# Patient Record
Sex: FEMALE | Race: WHITE | NOT HISPANIC OR LATINO | Employment: OTHER | URBAN - METROPOLITAN AREA
[De-identification: names, ages, dates, MRNs, and addresses within clinical notes are randomized per-mention and may not be internally consistent; named-entity substitution may affect disease eponyms.]

---

## 2018-12-28 ENCOUNTER — APPOINTMENT (OUTPATIENT)
Dept: WOMENS IMAGING | Facility: HOSPITAL | Age: 68
End: 2018-12-28

## 2018-12-28 PROCEDURE — 77067 SCR MAMMO BI INCL CAD: CPT | Performed by: RADIOLOGY

## 2018-12-28 PROCEDURE — 77063 BREAST TOMOSYNTHESIS BI: CPT | Performed by: RADIOLOGY

## 2018-12-28 PROCEDURE — 77080 DXA BONE DENSITY AXIAL: CPT | Performed by: RADIOLOGY

## 2019-03-15 ENCOUNTER — APPOINTMENT (OUTPATIENT)
Dept: GENERAL RADIOLOGY | Facility: HOSPITAL | Age: 69
End: 2019-03-15

## 2019-03-15 PROCEDURE — 73564 X-RAY EXAM KNEE 4 OR MORE: CPT | Performed by: GENERAL PRACTICE

## 2019-03-22 ENCOUNTER — OFFICE VISIT (OUTPATIENT)
Dept: ORTHOPEDIC SURGERY | Facility: CLINIC | Age: 69
End: 2019-03-22

## 2019-03-22 VITALS — HEIGHT: 67 IN | WEIGHT: 170 LBS | TEMPERATURE: 97.6 F | BODY MASS INDEX: 26.68 KG/M2

## 2019-03-22 DIAGNOSIS — M17.10 ARTHRITIS OF KNEE: Primary | ICD-10-CM

## 2019-03-22 PROCEDURE — 99202 OFFICE O/P NEW SF 15 MIN: CPT | Performed by: ORTHOPAEDIC SURGERY

## 2019-03-22 RX ORDER — PHENOL 1.4 %
600 AEROSOL, SPRAY (ML) MUCOUS MEMBRANE DAILY
COMMUNITY

## 2019-03-22 RX ORDER — MELATONIN
1000 DAILY
COMMUNITY

## 2019-03-24 NOTE — PROGRESS NOTES
Patient: Eunice Beach    YOB: 1950    Medical Record Number: [id number]    Chief Complaints:  Right knee pain    History of Present Illness:     68 y.o. female patient who presents for evaluation of her right knee.  She injured the knee while stepping off of the tour bus in [place] approximately 3 weeks ago.  She felt a sharp, shooting pain in her knee at the time.  The pain has gotten significantly better over the past few weeks.  Her symptoms are worse with standing for prolonged period of time.  Tylenol, ice and a brace have all helped.  Denies any clicking, popping or catching.  Denies any other associated complaints or issues.    Allergies: No Known Allergies    Medications:   Home Medications    Current Outpatient Medications:   •  acetaminophen (TYLENOL) 500 MG tablet, Take 1,000 mg by mouth., Disp: , Rfl:   •  alendronate (FOSAMAX) 70 MG tablet, Take 70 mg by mouth Every 7 (Seven) Days., Disp: , Rfl:   •  amLODIPine (NORVASC) 10 MG tablet, Take 10 mg by mouth Daily., Disp: , Rfl:   •  calcium carbonate (OS-ULYSSES) 600 MG tablet, Take 600 mg by mouth 2 (Two) Times a Day With Meals., Disp: , Rfl:   •  cholecalciferol (VITAMIN D3) 1000 units tablet, Take 1,000 Units by mouth Daily., Disp: , Rfl:   •  lisinopril (PRINIVIL,ZESTRIL) 40 MG tablet, Take 40 mg by mouth Daily., Disp: , Rfl:   •  metoprolol tartrate (LOPRESSOR) 50 MG tablet, Take 50 mg by mouth 2 (Two) Times a Day., Disp: , Rfl:   •  mirtazapine (REMERON) 15 MG tablet, Take 15 mg by mouth Every Night., Disp: , Rfl:   •  rOPINIRole (REQUIP) 1 MG tablet, Take 1 mg by mouth Every Night., Disp: , Rfl:   •  tiZANidine (ZANAFLEX) 2 MG tablet, Take 2 mg by mouth Every 8 (Eight) Hours As Needed., Disp: , Rfl:     Past Medical History:   Diagnosis Date   • Hypertension    • Osteopenia    • Postmenopausal    • RLS (restless legs syndrome)    • Vitamin D deficiency        History reviewed. No pertinent surgical history.    Social History  "    Occupational History   • Not on file   Tobacco Use   • Smoking status: Unknown If Ever Smoked   • Smokeless tobacco: Never Used   Substance and Sexual Activity   • Alcohol use: No     Frequency: Never   • Drug use: No   • Sexual activity: Not on file      Social History     Social History Narrative   • Not on file       History reviewed. No pertinent family history.    Review of Systems:      Constitutional: Denies fever, shaking or chills   Eyes: Denies change in visual acuity   HEENT: Denies nasal congestion or sore throat   Respiratory: Denies cough or shortness of breath   Cardiovascular: Denies chest pain or edema  Endocrine: Denies tremors, palpitations, intolerance of heat or cold, polyuria, polydipsia.  GI: Denies abdominal pain, nausea, vomiting, bloody stools or diarrhea  : Denies frequency, urgency, incontinence, retention, or nocturia.  Musculoskeletal: Denies numbness, tingling or loss of motor function except as above  Integument: Denies rash, lesion or ulceration   Neurologic: Denies headache or focal weakness, deficits  Heme: Denies spontaneous or excessive bleeding, epistaxis, hematuria, melena, fatigue, enlarged or tender lymph nodes.      All other pertinent positives and negatives as noted above in HPI.    Physical Exam: 68 y.o. female  Vitals:    03/22/19 1124   Temp: 97.6 °F (36.4 °C)   TempSrc: Temporal   Weight: 77.1 kg (170 lb)   Height: 170.2 cm (67\")     General:  Patient is awake and alert.  Appears in no acute distress or discomfort.    Psych:  Affect and demeanor are appropriate.    Eyes:  Conjunctiva and sclera appear grossly normal.  Eyes track well and EOM seem to be intact.    Ears:  No gross abnormalities.  Hearing adequate for the exam.    Cardiovascular:  Regular rate and rhythm.    Lungs:  Good chest expansion.  Breathing unlabored.    Spine:  Back appears grossly normal.  No palpable masses or adenopathy.  Good motion.  Straight leg raise and crossed straight leg raise " manuever are both negative for lower leg and/or knee pain.    Extremities:  Skin is benign.  No obvious gross abnormalities about right knee.  No palpable masses or adenopathy.  Mild tenderness noted over lateral joint line.  Motion is symmetric to the contralateral side.  No instability.  Strength is well preserved including hip flexion, knee extension, ankle and toe plantarflexion, ankle inversion and eversion.  Good sensory function throughout the leg and foot.  Palpable pulses.  Brisk capillary refill.  Good skin turgor.         Radiology:   Outside AP, oblique and lateral views of the right knee are reviewed on the epic system along with the associated report.  She has tricompartment degenerative changes.  She appears to have significant lateral compartment osteoarthritis with near bone-on-bone degeneration, osteophyte formation and significant subchondral sclerosis.  She may have a component of avascular necrosis as well.    Assessment/Plan:  Right knee osteoarthritis    I showed her the x-rays and explained the significance of the findings.  She is minimally symptomatic at this point and I do not recommend any intervention for her.  Down the road, if her symptoms worsen, she may be a candidate for injections.  She will follow-up as needed.    Garo Sutton MD    03/22/2019

## 2019-04-10 ENCOUNTER — OFFICE VISIT (OUTPATIENT)
Dept: ORTHOPEDIC SURGERY | Facility: CLINIC | Age: 69
End: 2019-04-10

## 2019-04-10 VITALS — BODY MASS INDEX: 26.68 KG/M2 | TEMPERATURE: 97.2 F | WEIGHT: 170 LBS | HEIGHT: 67 IN

## 2019-04-10 DIAGNOSIS — M17.11 PRIMARY OSTEOARTHRITIS OF RIGHT KNEE: Primary | ICD-10-CM

## 2019-04-10 PROCEDURE — 99212 OFFICE O/P EST SF 10 MIN: CPT | Performed by: NURSE PRACTITIONER

## 2019-04-10 PROCEDURE — 20610 DRAIN/INJ JOINT/BURSA W/O US: CPT | Performed by: NURSE PRACTITIONER

## 2019-04-10 RX ORDER — METHYLPREDNISOLONE ACETATE 80 MG/ML
80 INJECTION, SUSPENSION INTRA-ARTICULAR; INTRALESIONAL; INTRAMUSCULAR; SOFT TISSUE
Status: COMPLETED | OUTPATIENT
Start: 2019-04-10 | End: 2019-04-10

## 2019-04-10 RX ADMIN — METHYLPREDNISOLONE ACETATE 80 MG: 80 INJECTION, SUSPENSION INTRA-ARTICULAR; INTRALESIONAL; INTRAMUSCULAR; SOFT TISSUE at 11:11

## 2019-04-10 NOTE — PROGRESS NOTES
"Eunice Beach     : 1950     MRN: [id number]     DATE: 4/10/2019    CC:   Right knee pain    SUBJECTIVE:  Patient returns today for follow up.  Reports her pain has slightly improved since her last visit with Dr. Sutton on 19, but continues to complain of pain with standing and walking.  She has tried Tylenol with mild, temporary relief.  She is interested in a steroid injection, as discussed with Dr. Sutton.    OBJECTIVE:    Temp 97.2 °F (36.2 °C) (Temporal)   Ht 170.2 cm (67\")   Wt 77.1 kg (170 lb)   BMI 26.63 kg/m²     Exam:.     General:  Patient is awake and alert.  No acute distress.  Affect and demeanor appropriate.    Extremities:  Skin about knee is benign.  Motion remains full, but uncomfortable.  Gait is slightly antalgic.    DIAGNOSTIC STUDIES    Xrays:  Previous x-rays are reviewed.  The x-rays show she has tricompartment degenerative changes.  She appears to have significant lateral compartment osteoarthritis with near bone-on-bone degeneration, osteophyte formation and significant subchondral sclerosis.      ASSESSMENT:  Right knee osteoarthritis    PLAN:  We discussed treatment options in detail.  I recommended she try a corticosteroid injection today.  The risks, benefits, and alternatives were thoroughly discussed.  Patient consented to proceed.  Also, I have entered an order for physical therapy.  Going forward, I will release the patient to follow-up as needed.  I informed the patient that if the pain persist and/or recurs,  I would be happy to see her back.      Large Joint Arthrocentesis: R knee  Date/Time: 4/10/2019 11:11 AM  Consent given by: patient  Site marked: site marked  Timeout: Immediately prior to procedure a time out was called to verify the correct patient, procedure, equipment, support staff and site/side marked as required   Supporting Documentation  Indications: pain   Procedure Details  Location: knee - R knee  Preparation: Patient was prepped and draped " in the usual sterile fashion  Needle gauge: 21 G.  Approach: anterolateral  Medications administered: 80 mg methylPREDNISolone acetate 80 MG/ML; 2 mL lidocaine (cardiac) 20 MG/ML  Patient tolerance: patient tolerated the procedure well with no immediate complications        ROCK Knight  04/10/2019

## 2019-04-30 ENCOUNTER — HOSPITAL ENCOUNTER (OUTPATIENT)
Dept: PHYSICAL THERAPY | Facility: HOSPITAL | Age: 69
Setting detail: THERAPIES SERIES
Discharge: HOME | End: 2019-04-30

## 2019-04-30 DIAGNOSIS — G89.29 CHRONIC PAIN OF RIGHT KNEE: Primary | ICD-10-CM

## 2019-04-30 DIAGNOSIS — M25.561 CHRONIC PAIN OF RIGHT KNEE: Primary | ICD-10-CM

## 2019-04-30 PROCEDURE — 97110 THERAPEUTIC EXERCISES: CPT | Performed by: PHYSICAL THERAPIST

## 2019-04-30 PROCEDURE — 97161 PT EVAL LOW COMPLEX 20 MIN: CPT | Performed by: PHYSICAL THERAPIST

## 2019-07-22 ENCOUNTER — CLINICAL SUPPORT (OUTPATIENT)
Dept: ORTHOPEDIC SURGERY | Facility: CLINIC | Age: 69
End: 2019-07-22

## 2019-07-22 VITALS — BODY MASS INDEX: 26.68 KG/M2 | TEMPERATURE: 98.1 F | HEIGHT: 67 IN | WEIGHT: 170 LBS

## 2019-07-22 DIAGNOSIS — M17.11 PRIMARY OSTEOARTHRITIS OF RIGHT KNEE: Primary | ICD-10-CM

## 2019-07-22 PROCEDURE — 20610 DRAIN/INJ JOINT/BURSA W/O US: CPT | Performed by: NURSE PRACTITIONER

## 2019-07-22 RX ORDER — LIDOCAINE HYDROCHLORIDE 10 MG/ML
2 INJECTION, SOLUTION EPIDURAL; INFILTRATION; INTRACAUDAL; PERINEURAL
Status: COMPLETED | OUTPATIENT
Start: 2019-07-22 | End: 2019-07-22

## 2019-07-22 RX ORDER — METHYLPREDNISOLONE ACETATE 80 MG/ML
80 INJECTION, SUSPENSION INTRA-ARTICULAR; INTRALESIONAL; INTRAMUSCULAR; SOFT TISSUE
Status: COMPLETED | OUTPATIENT
Start: 2019-07-22 | End: 2019-07-22

## 2019-07-22 RX ADMIN — LIDOCAINE HYDROCHLORIDE 2 ML: 10 INJECTION, SOLUTION EPIDURAL; INFILTRATION; INTRACAUDAL; PERINEURAL at 10:55

## 2019-07-22 RX ADMIN — METHYLPREDNISOLONE ACETATE 80 MG: 80 INJECTION, SUSPENSION INTRA-ARTICULAR; INTRALESIONAL; INTRAMUSCULAR; SOFT TISSUE at 10:55

## 2019-07-22 NOTE — PROGRESS NOTES
Large Joint Arthrocentesis: R knee  Date/Time: 7/22/2019 10:55 AM  Consent given by: patient  Site marked: site marked  Timeout: Immediately prior to procedure a time out was called to verify the correct patient, procedure, equipment, support staff and site/side marked as required   Supporting Documentation  Indications: pain and joint swelling   Procedure Details  Location: knee - R knee  Preparation: Patient was prepped and draped in the usual sterile fashion  Needle gauge: 21g.  Approach: anterolateral  Medications administered: 2 mL lidocaine PF 1% 1 %; 80 mg methylPREDNISolone acetate 80 MG/ML  Patient tolerance: patient tolerated the procedure well with no immediate complications        Ms. Beach comes in today for follow-up.  Injections have worked well in the past.  The patient would like to get a repeat injection today.  The risks, benefits and alternatives were discussed and the patient consented.  Going forward, the patient will follow-up as needed.    Elizabeth Ny, ROCK    07/22/2019

## 2019-11-22 ENCOUNTER — OFFICE VISIT (OUTPATIENT)
Dept: ORTHOPEDIC SURGERY | Facility: CLINIC | Age: 69
End: 2019-11-22

## 2019-11-22 VITALS — WEIGHT: 175 LBS | HEIGHT: 67 IN | TEMPERATURE: 98.6 F | BODY MASS INDEX: 27.47 KG/M2

## 2019-11-22 DIAGNOSIS — M17.10 ARTHRITIS OF KNEE: Primary | ICD-10-CM

## 2019-11-22 PROCEDURE — 20610 DRAIN/INJ JOINT/BURSA W/O US: CPT | Performed by: ORTHOPAEDIC SURGERY

## 2019-11-22 RX ORDER — INFLUENZA A VIRUS A/MICHIGAN/45/2015 X-275 (H1N1) ANTIGEN (FORMALDEHYDE INACTIVATED), INFLUENZA A VIRUS A/SINGAPORE/INFIMH-16-0019/2016 IVR-186 (H3N2) ANTIGEN (FORMALDEHYDE INACTIVATED), AND INFLUENZA B VIRUS B/MARYLAND/15/2016 BX-69A (A B/COLORADO/6/2017-LIKE VIRUS) ANTIGEN (FORMALDEHYDE INACTIVATED) 60; 60; 60 UG/.5ML; UG/.5ML; UG/.5ML
INJECTION, SUSPENSION INTRAMUSCULAR
Refills: 0 | COMMUNITY
Start: 2019-10-02 | End: 2020-09-04

## 2019-11-22 RX ORDER — METHYLPREDNISOLONE ACETATE 80 MG/ML
80 INJECTION, SUSPENSION INTRA-ARTICULAR; INTRALESIONAL; INTRAMUSCULAR; SOFT TISSUE
Status: COMPLETED | OUTPATIENT
Start: 2019-11-22 | End: 2019-11-22

## 2019-11-22 RX ORDER — INDAPAMIDE 2.5 MG/1
2.5 TABLET, FILM COATED ORAL DAILY
COMMUNITY
Start: 2019-11-04 | End: 2020-09-04

## 2019-11-22 RX ORDER — LIDOCAINE HYDROCHLORIDE 10 MG/ML
2 INJECTION, SOLUTION EPIDURAL; INFILTRATION; INTRACAUDAL; PERINEURAL
Status: COMPLETED | OUTPATIENT
Start: 2019-11-22 | End: 2019-11-22

## 2019-11-22 RX ADMIN — LIDOCAINE HYDROCHLORIDE 2 ML: 10 INJECTION, SOLUTION EPIDURAL; INFILTRATION; INTRACAUDAL; PERINEURAL at 09:43

## 2019-11-22 RX ADMIN — METHYLPREDNISOLONE ACETATE 80 MG: 80 INJECTION, SUSPENSION INTRA-ARTICULAR; INTRALESIONAL; INTRAMUSCULAR; SOFT TISSUE at 09:43

## 2019-11-22 NOTE — PROGRESS NOTES
Ms. Beach comes in today for a repeat knee injection.  She is gotten 2 injections over the past 6 months.  The injections have helped tremendously.  She recently traveled to [place] and was doing quite a bit of walking.  Her knee is now inflamed.  I did examine her right knee.  Skin is benign.  There is no significant effusion.  Mild to moderate lateral tenderness.    The risk, benefits and alternatives to repeat injection were discussed.  She consented and the injection was performed as described below.  She will follow-up as needed.    Large Joint Arthrocentesis: R knee  Date/Time: 11/22/2019 9:43 AM  Consent given by: patient  Site marked: site marked  Timeout: Immediately prior to procedure a time out was called to verify the correct patient, procedure, equipment, support staff and site/side marked as required   Supporting Documentation  Indications: pain and joint swelling   Procedure Details  Location: knee - R knee  Preparation: Patient was prepped and draped in the usual sterile fashion  Needle gauge: 21.  Approach: anterolateral  Medications administered: 2 mL lidocaine PF 1% 1 %; 80 mg methylPREDNISolone acetate 80 MG/ML  Patient tolerance: patient tolerated the procedure well with no immediate complications

## 2020-03-09 ENCOUNTER — CLINICAL SUPPORT (OUTPATIENT)
Dept: ORTHOPEDIC SURGERY | Facility: CLINIC | Age: 70
End: 2020-03-09

## 2020-03-09 VITALS — WEIGHT: 178 LBS | BODY MASS INDEX: 27.94 KG/M2 | HEIGHT: 67 IN | TEMPERATURE: 97.4 F

## 2020-03-09 DIAGNOSIS — M17.11 PRIMARY OSTEOARTHRITIS OF RIGHT KNEE: Primary | ICD-10-CM

## 2020-03-09 PROCEDURE — 20610 DRAIN/INJ JOINT/BURSA W/O US: CPT | Performed by: NURSE PRACTITIONER

## 2020-03-09 RX ORDER — METHYLPREDNISOLONE ACETATE 80 MG/ML
80 INJECTION, SUSPENSION INTRA-ARTICULAR; INTRALESIONAL; INTRAMUSCULAR; SOFT TISSUE
Status: COMPLETED | OUTPATIENT
Start: 2020-03-09 | End: 2020-03-09

## 2020-03-09 RX ADMIN — METHYLPREDNISOLONE ACETATE 80 MG: 80 INJECTION, SUSPENSION INTRA-ARTICULAR; INTRALESIONAL; INTRAMUSCULAR; SOFT TISSUE at 11:56

## 2020-03-09 NOTE — PROGRESS NOTES
Ms. Beach comes in today for follow-up.  Injections have worked well in the past.  The patient would like to get a repeat injection today.  She is considering surgery later this year.  The risks, benefits and alternatives were discussed and the patient consented.  Going forward, the patient will follow-up as needed.    ROCK Knight    03/09/2020      Large Joint Arthrocentesis: R knee  Date/Time: 3/9/2020 11:56 AM  Consent given by: patient  Site marked: site marked  Timeout: Immediately prior to procedure a time out was called to verify the correct patient, procedure, equipment, support staff and site/side marked as required   Supporting Documentation  Indications: pain and joint swelling   Procedure Details  Location: knee - R knee  Preparation: Patient was prepped and draped in the usual sterile fashion  Needle gauge: 21G.  Approach: anterolateral  Medications administered: 2 mL lidocaine (cardiac); 80 mg methylPREDNISolone acetate 80 MG/ML  Patient tolerance: patient tolerated the procedure well with no immediate complications

## 2020-07-06 ENCOUNTER — CLINICAL SUPPORT (OUTPATIENT)
Dept: ORTHOPEDIC SURGERY | Facility: CLINIC | Age: 70
End: 2020-07-06

## 2020-07-06 VITALS — TEMPERATURE: 98 F | HEIGHT: 67 IN | BODY MASS INDEX: 26.68 KG/M2 | WEIGHT: 170 LBS

## 2020-07-06 DIAGNOSIS — M17.11 PRIMARY OSTEOARTHRITIS OF RIGHT KNEE: Primary | ICD-10-CM

## 2020-07-06 PROCEDURE — 73562 X-RAY EXAM OF KNEE 3: CPT | Performed by: NURSE PRACTITIONER

## 2020-07-06 PROCEDURE — 20610 DRAIN/INJ JOINT/BURSA W/O US: CPT | Performed by: NURSE PRACTITIONER

## 2020-07-06 RX ORDER — METHYLPREDNISOLONE ACETATE 40 MG/ML
80 INJECTION, SUSPENSION INTRA-ARTICULAR; INTRALESIONAL; INTRAMUSCULAR; SOFT TISSUE
Status: COMPLETED | OUTPATIENT
Start: 2020-07-06 | End: 2020-07-06

## 2020-07-06 RX ADMIN — METHYLPREDNISOLONE ACETATE 80 MG: 40 INJECTION, SUSPENSION INTRA-ARTICULAR; INTRALESIONAL; INTRAMUSCULAR; SOFT TISSUE at 11:50

## 2020-07-06 NOTE — PROGRESS NOTES
Ms. Beach comes in today for follow-up.  Injections have worked well in the past.  The patient would like to get a repeat injection today.     Imaging:  Dr. Sutton and I reviewed the x-rays together.  Bilateral standing AP views, bilateral merchants views and a lateral view of the right knee are ordered by myself and reviewed to evaluate the patient's complaint. These were compared to previous films. The x-rays show tricompartmental degenerative changes.  She appears to have significant lateral compartment osteoarthritis with near bone-on-bone degeneration, osteophyte formation, and significant subchondral sclerosis.  There is a loose body noted at the lateral aspect of the patella.     The risks, benefits and alternatives were discussed and the patient consented.  Going forward, the patient will follow-up as needed.    ROCK Knight    07/06/2020      Large Joint Arthrocentesis: R knee  Date/Time: 7/6/2020 11:50 AM  Consent given by: patient  Site marked: site marked  Timeout: Immediately prior to procedure a time out was called to verify the correct patient, procedure, equipment, support staff and site/side marked as required   Supporting Documentation  Indications: pain and joint swelling   Procedure Details  Location: knee - R knee  Preparation: Patient was prepped and draped in the usual sterile fashion  Needle gauge: 21 G.  Approach: anterolateral  Medications administered: 2 mL lidocaine (cardiac); 80 mg methylPREDNISolone acetate 40 MG/ML  Patient tolerance: patient tolerated the procedure well with no immediate complications

## 2020-09-01 ENCOUNTER — APPOINTMENT (OUTPATIENT)
Dept: GENERAL RADIOLOGY | Facility: HOSPITAL | Age: 70
End: 2020-09-01

## 2020-09-01 ENCOUNTER — HOSPITAL ENCOUNTER (EMERGENCY)
Facility: HOSPITAL | Age: 70
Discharge: HOME | End: 2020-09-01
Attending: EMERGENCY MEDICINE | Admitting: EMERGENCY MEDICINE

## 2020-09-01 VITALS
HEIGHT: 67 IN | BODY MASS INDEX: 26.68 KG/M2 | DIASTOLIC BLOOD PRESSURE: 108 MMHG | RESPIRATION RATE: 20 BRPM | WEIGHT: 170 LBS | HEART RATE: 83 BPM | TEMPERATURE: 99.2 F | OXYGEN SATURATION: 94 % | SYSTOLIC BLOOD PRESSURE: 158 MMHG

## 2020-09-01 DIAGNOSIS — S82.831A CLOSED FRACTURE OF DISTAL END OF RIGHT FIBULA, UNSPECIFIED FRACTURE MORPHOLOGY, INITIAL ENCOUNTER: Primary | ICD-10-CM

## 2020-09-01 PROCEDURE — 73610 X-RAY EXAM OF ANKLE: CPT

## 2020-09-01 PROCEDURE — 99283 EMERGENCY DEPT VISIT LOW MDM: CPT

## 2020-09-01 RX ORDER — HYDROCODONE BITARTRATE AND ACETAMINOPHEN 5; 325 MG/1; MG/1
1 TABLET ORAL EVERY 6 HOURS PRN
Qty: 5 TABLET | Refills: 0 | Status: SHIPPED | OUTPATIENT
Start: 2020-09-01 | End: 2020-09-24

## 2020-09-01 NOTE — ED TRIAGE NOTES
Foot was asleep when she stood up on Sunday and she twisted it    Patient was placed in face mask during first look triage.  Patient was wearing a face mask throughout encounter.  I wore personal protective equipment throughout the encounter.  Hand hygiene was performed before and after patient encounter.

## 2020-09-01 NOTE — ED PROVIDER NOTES
EMERGENCY DEPARTMENT ENCOUNTER    Room Number:  11/11  PCP: Tianna Robins MD  Historian: Patient  History Limited By: Nothing      HPI  Chief Complaint: Right ankle injury  Context: Eunice Beach is a 69 y.o. female who presents to the ED c/o right ankle injury.  Patient states she was sitting in a recliner and stood up.  States her foot was asleep and she fell injuring her right ankle.  Patient states this happened on Sunday.  She has not had no prior ankle injury.  Patient has no weakness or numbness now.  Patient has been bearing weight since then.      Location: Right ankle  Radiation: None  Character: Aching  Duration: 2 days  Severity: Moderate  Progression: Not improving  Aggravating Factors: Walking  Alleviating Factors: Remaining still        MEDICAL RECORD REVIEW    Has been seen by Dr. Sutton in the past          PAST MEDICAL HISTORY  Active Ambulatory Problems     Diagnosis Date Noted   • No Active Ambulatory Problems     Resolved Ambulatory Problems     Diagnosis Date Noted   • No Resolved Ambulatory Problems     Past Medical History:   Diagnosis Date   • Hypertension    • Osteopenia    • Postmenopausal    • RLS (restless legs syndrome)    • Vitamin D deficiency          PAST SURGICAL HISTORY  No past surgical history on file.      FAMILY HISTORY  No family history on file.      SOCIAL HISTORY  Social History     Socioeconomic History   • Marital status: Unknown     Spouse name: Not on file   • Number of children: Not on file   • Years of education: Not on file   • Highest education level: Not on file   Tobacco Use   • Smoking status: Never Smoker   • Smokeless tobacco: Never Used   Substance and Sexual Activity   • Alcohol use: No     Frequency: Never   • Drug use: No   • Sexual activity: Defer         ALLERGIES  Zithromax [azithromycin]        REVIEW OF SYSTEMS  Review of Systems   Constitutional: Negative for fever.   Musculoskeletal: Positive for joint swelling.   Neurological: Negative  for weakness and numbness.            PHYSICAL EXAM  ED Triage Vitals   Temp Heart Rate Resp BP SpO2   09/01/20 1222 09/01/20 1222 09/01/20 1222 09/01/20 1237 09/01/20 1222   99.2 °F (37.3 °C) 86 16 154/94 95 %      Temp src Heart Rate Source Patient Position BP Location FiO2 (%)   09/01/20 1222 09/01/20 1222 -- -- --   Tympanic Monitor          Physical Exam   Constitutional: She is oriented to person, place, and time and well-developed, well-nourished, and in no distress.   HENT:   Head: Normocephalic and atraumatic.   Musculoskeletal: She exhibits tenderness.   Tenderness swelling to her right ankle.  No base of the foot tenderness.  No Achilles tenderness.   Neurological: She is alert and oriented to person, place, and time.   Skin: Skin is warm and dry.   Psychiatric: Affect normal.           LAB RESULTS  No results found for this or any previous visit (from the past 24 hour(s)).    Ordered the above labs and reviewed the results.        RADIOLOGY  XR Ankle 3+ View Right   Final Result           Ordered the above noted radiological studies. Reviewed by me in PACS.            PROCEDURES  Procedures            MEDICATIONS GIVEN IN ER  Medications - No data to display          PROGRESS AND CONSULTS  ED Course as of Sep 01 1626   Tue Sep 01, 2020   1555 15:56  Patient here for ankle injury and has distal fibular fracture.  Patient has been walking on it for 2 days already.  Patient has preservation of the mortise.  Attempted to talk to her orthopedist however he is in surgery.  Patient will be discharged home.  Will be put in walking boot.  All amount of pain medication and she is to follow-up with him tomorrow    [SL]   1626 16:26  Discussed with Dr. Sutton who agrees with our plan.    [SL]      ED Course User Index  [SL] Priyank Le MD           MEDICAL DECISION MAKING      MDM  Number of Diagnoses or Management Options  Closed fracture of distal end of right fibula, unspecified fracture morphology,  initial encounter:      Amount and/or Complexity of Data Reviewed  Tests in the radiology section of CPT®: reviewed and ordered (Distal fibular fracture)  Discuss the patient with other providers: yes (Discussed with Dr. Sutton.  Walking boot and follow-up in the office)               DIAGNOSIS  Final diagnoses:   Closed fracture of distal end of right fibula, unspecified fracture morphology, initial encounter           DISPOSITION  DISCHARGE    Patient discharged in stable condition.    Reviewed implications of results, diagnosis, meds, responsibility to follow up, warning signs and symptoms of possible worsening, potential complications and reasons to return to ER, including worsening pain.    Patient/Family voiced understanding of above instructions.    Discussed plan for discharge, as there is no emergent indication for admission. Patient referred to primary care provider for BP management due to today's BP. Pt/family is agreeable and understands need for follow up and repeat testing.  Pt is aware that discharge does not mean that nothing is wrong but it indicates no emergency is present that requires admission and they must continue care with follow-up as given below or physician of their choice.     FOLLOW-UP  Garo Sutton MD  [facility] [place] [address]  [phone]               Medication List      New Prescriptions    HYDROcodone-acetaminophen 5-325 MG per tablet  Commonly known as:  NORCO  Take 1 tablet by mouth Every 6 (Six) Hours As Needed for Moderate Pain .                Latest Documented Vital Signs:  As of 16:25  BP- (!) 158/108 HR- 83 Temp- 99.2 °F (37.3 °C) (Tympanic) O2 sat- 94%                       Priyank Le MD  09/01/20 2172

## 2020-09-02 ENCOUNTER — TELEPHONE (OUTPATIENT)
Dept: ORTHOPEDIC SURGERY | Facility: CLINIC | Age: 70
End: 2020-09-02

## 2020-09-02 NOTE — TELEPHONE ENCOUNTER
Would be best to see today this afternoon if there is any way she cannot make it but if can come in today than just put her on somewhere tomorrow afternoon and tell her she is going to have to be prepared to wait quite a bit.  Just let me know.

## 2020-09-02 NOTE — TELEPHONE ENCOUNTER
Attempted to contact pt to schedule appt, home number rings busy with no vm and left vm of cell phone

## 2020-09-03 ENCOUNTER — OFFICE VISIT (OUTPATIENT)
Dept: ORTHOPEDIC SURGERY | Facility: CLINIC | Age: 70
End: 2020-09-03

## 2020-09-03 VITALS — WEIGHT: 170 LBS | HEIGHT: 67 IN | BODY MASS INDEX: 26.68 KG/M2 | TEMPERATURE: 97.1 F

## 2020-09-03 DIAGNOSIS — M25.571 RIGHT ANKLE PAIN, UNSPECIFIED CHRONICITY: ICD-10-CM

## 2020-09-03 DIAGNOSIS — S82.61XA CLOSED DISPLACED FRACTURE OF LATERAL MALLEOLUS OF RIGHT FIBULA, INITIAL ENCOUNTER: Primary | ICD-10-CM

## 2020-09-03 PROCEDURE — 73610 X-RAY EXAM OF ANKLE: CPT | Performed by: ORTHOPAEDIC SURGERY

## 2020-09-03 PROCEDURE — 99214 OFFICE O/P EST MOD 30 MIN: CPT | Performed by: ORTHOPAEDIC SURGERY

## 2020-09-03 RX ORDER — AMLODIPINE BESYLATE 2.5 MG/1
2.5 TABLET ORAL EVERY MORNING
COMMUNITY
Start: 2020-07-21

## 2020-09-03 RX ORDER — CEFAZOLIN SODIUM 2 G/100ML
2 INJECTION, SOLUTION INTRAVENOUS ONCE
Status: CANCELLED | OUTPATIENT
Start: 2020-09-08 | End: 2020-09-03

## 2020-09-03 NOTE — PROGRESS NOTES
New Patient Complaint      Patient: Eunice Beach  YOB: 1950 69 y.o. female  Medical Record Number: [id number]    Chief Complaints: I hurt my ankle    History of Present Illness: Patient injured her right ankle on 8/30/2020 when she fell on this while at home.  She had persistent complaints of pain and was seen in the [facility] ER on 9/1/2020 and placed into a boot and has been using either a walker or cane with moderate intermittent stabbing aching pain with bruising and swelling to the right ankle worse with standing and improved with ice and rest.        HPI    Allergies:   Allergies   Allergen Reactions   • Zithromax [Azithromycin] Hives       Medications:   Current Outpatient Medications on File Prior to Visit   Medication Sig   • acetaminophen (TYLENOL) 500 MG tablet Take 1,000 mg by mouth.   • amLODIPine (NORVASC) 2.5 MG tablet    • calcium carbonate (OS-ULYSSES) 600 MG tablet Take 600 mg by mouth 2 (Two) Times a Day With Meals.   • cholecalciferol (VITAMIN D3) 1000 units tablet Take 1,000 Units by mouth Daily.   • FLUZONE HIGH-DOSE 0.5 ML suspension prefilled syringe injection ADM 0.5ML IM UTD   • HYDROcodone-acetaminophen (NORCO) 5-325 MG per tablet Take 1 tablet by mouth Every 6 (Six) Hours As Needed for Moderate Pain .   • lisinopril (PRINIVIL,ZESTRIL) 40 MG tablet Take 40 mg by mouth Daily.   • metoprolol tartrate (LOPRESSOR) 50 MG tablet Take 50 mg by mouth 2 (Two) Times a Day.   • mirtazapine (REMERON) 15 MG tablet Take 15 mg by mouth Every Night.   • rOPINIRole (REQUIP) 1 MG tablet Take 1 mg by mouth Every Night.   • tiZANidine (ZANAFLEX) 2 MG tablet Take 2 mg by mouth Every 8 (Eight) Hours As Needed.   • Triamcinolone Acetonide (NASACORT ALLERGY 24HR NA) into the nostril(s) as directed by provider.   • alendronate (FOSAMAX) 70 MG tablet Take 70 mg by mouth Every 7 (Seven) Days.   • amLODIPine (NORVASC) 10 MG tablet Take 10 mg by mouth Daily.   • indapamide (LOZOL) 2.5 MG tablet Take  "2.5 mg by mouth Daily.     No current facility-administered medications on file prior to visit.        Past Medical History:   Diagnosis Date   • Hypertension    • Osteopenia    • Postmenopausal    • RLS (restless legs syndrome)    • Vitamin D deficiency      No past surgical history on file.  Social History     Occupational History   • Not on file   Tobacco Use   • Smoking status: Never Smoker   • Smokeless tobacco: Never Used   Substance and Sexual Activity   • Alcohol use: No     Frequency: Never   • Drug use: No   • Sexual activity: Defer      Social History     Social History Narrative   • Not on file     History reviewed. No pertinent family history.    Review of Systems: 14 point review of systems performed, positive pertinent findings identified in HPI. All remaining systems negative except congestion, drooling, postnasal drip and mucus in the nasal cavity with associated cough, muscle ache    Review of Systems      Physical Exam:   Vitals:    09/03/20 1412   Temp: 97.1 °F (36.2 °C)   Weight: 77.1 kg (170 lb)   Height: 170.2 cm (67\")   PainSc:   7     Physical Exam   Constitutional: pleasant, well developed   Eyes: sclera non icteric  Hearing : adequate for exam  Cardiovascular: palpable pulses in right foot, right calf/ thigh NT without sign of DVT  Respiratoy: breathing unlabored   Neurological: grossly sensate to LT throughout right LE  Psychiatric: oriented with normal mood and affect.   Lymphatic: No palpable popliteal lymphadenopathy right LE  Skin: intact throughout right leg/foot  Musculoskeletal: Right ankle shows mild swelling but no blistering.  There is mild to moderate discomfort over the lateral much more so the medial aspect of the ankle and slight discomfort with minimal range of motion which was not stressed today.  She was nontender over the proximal fibula nontender dorsum of the midfoot.  Physical Exam  Ortho Exam    Radiology: 3 views of the right ankle reviewed on the [facility] " from 9/1/2020 there appears to be a nondisplaced fracture of the medial malleolus extending into the proximal medial tibial area as well as a slightly displaced fracture of the lateral malleolus but I do not appreciate a clear posterior malleolus fracture but may be occult at all seen the lateral shift of the talus compared with the medial clear space.    3 views of the right ankle ordered today to evaluate alignment reviewed and compared with prior x-rays.  There appears to be questionable fracture of the medial aspect of the ankle as well as a slightly displaced and rotated fracture of the distal aspect of the lateral malleolus.      Assessment/Plan: 1.  Right distal fibula fracture with possible medial and/or posterior malleolar fracture.    We reviewed treatment options from a nonoperative and operative standpoint and other no guarantees could be given decision made to proceed with operative treatment in order to restore more anatomic alignment to the fibula and allow earlier weightbearing and range of motion.    She voiced a clear understanding of operative and nonoperative treatment options with associated risk benefits potential outcomes and complications of operative treatment which can include but not limited to heart attack stroke death pneumonia infection bleeding damage to blood vessels nerves or tendons blood clots pulmonary embolism persistent or worsening pain stiffness malunion nonunion and failure to return to presurgery and precondition levels of activity as well as wound healing complications.    She will continue with her boot and limited weightbearing only with a walker and arrangements were made for her to get a scooter and we can get a preoperative CT scan for planning and to see if there is any further fractures that would need to be addressed.    We will plan to proceed with this on outpatient basis on 9/8/2020 and she was encouraged to call if she has any questions prior to surgery.

## 2020-09-04 ENCOUNTER — TRANSCRIBE ORDERS (OUTPATIENT)
Dept: PREADMISSION TESTING | Facility: HOSPITAL | Age: 70
End: 2020-09-04

## 2020-09-04 ENCOUNTER — HOSPITAL ENCOUNTER (OUTPATIENT)
Dept: CT IMAGING | Facility: HOSPITAL | Age: 70
Discharge: HOME | End: 2020-09-04
Admitting: ORTHOPAEDIC SURGERY

## 2020-09-04 ENCOUNTER — APPOINTMENT (OUTPATIENT)
Dept: PREADMISSION TESTING | Facility: HOSPITAL | Age: 70
End: 2020-09-04

## 2020-09-04 VITALS
HEIGHT: 67 IN | RESPIRATION RATE: 20 BRPM | DIASTOLIC BLOOD PRESSURE: 83 MMHG | SYSTOLIC BLOOD PRESSURE: 158 MMHG | WEIGHT: 175 LBS | TEMPERATURE: 98.3 F | BODY MASS INDEX: 27.47 KG/M2 | HEART RATE: 82 BPM | OXYGEN SATURATION: 87 %

## 2020-09-04 DIAGNOSIS — Z01.818 OTHER SPECIFIED PRE-OPERATIVE EXAMINATION: Primary | ICD-10-CM

## 2020-09-04 DIAGNOSIS — S82.61XA CLOSED DISPLACED FRACTURE OF LATERAL MALLEOLUS OF RIGHT FIBULA, INITIAL ENCOUNTER: ICD-10-CM

## 2020-09-04 LAB
ANION GAP SERPL CALCULATED.3IONS-SCNC: 9.2 MMOL/L (ref 5–15)
BUN SERPL-MCNC: 23 MG/DL (ref 8–23)
BUN/CREAT SERPL: 18.4 (ref 7–25)
CALCIUM SPEC-SCNC: 9.4 MG/DL (ref 8.6–10.5)
CHLORIDE SERPL-SCNC: 105 MMOL/L (ref 98–107)
CO2 SERPL-SCNC: 26.8 MMOL/L (ref 22–29)
CREAT SERPL-MCNC: 1.25 MG/DL (ref 0.57–1)
DEPRECATED RDW RBC AUTO: 49.2 FL (ref 37–54)
ERYTHROCYTE [DISTWIDTH] IN BLOOD BY AUTOMATED COUNT: 13.8 % (ref 12.3–15.4)
GFR SERPL CREATININE-BSD FRML MDRD: 43 ML/MIN/1.73
GLUCOSE SERPL-MCNC: 115 MG/DL (ref 65–99)
HCT VFR BLD AUTO: 39.9 % (ref 34–46.6)
HGB BLD-MCNC: 13.3 G/DL (ref 12–15.9)
MCH RBC QN AUTO: 32.3 PG (ref 26.6–33)
MCHC RBC AUTO-ENTMCNC: 33.3 G/DL (ref 31.5–35.7)
MCV RBC AUTO: 96.8 FL (ref 79–97)
PLATELET # BLD AUTO: 154 10*3/MM3 (ref 140–450)
PMV BLD AUTO: 10 FL (ref 6–12)
POTASSIUM SERPL-SCNC: 4.6 MMOL/L (ref 3.5–5.2)
RBC # BLD AUTO: 4.12 10*6/MM3 (ref 3.77–5.28)
SODIUM SERPL-SCNC: 141 MMOL/L (ref 136–145)
WBC # BLD AUTO: 8.18 10*3/MM3 (ref 3.4–10.8)

## 2020-09-04 PROCEDURE — 73700 CT LOWER EXTREMITY W/O DYE: CPT

## 2020-09-04 PROCEDURE — 85027 COMPLETE CBC AUTOMATED: CPT | Performed by: ORTHOPAEDIC SURGERY

## 2020-09-04 PROCEDURE — 93005 ELECTROCARDIOGRAM TRACING: CPT

## 2020-09-04 PROCEDURE — 93010 ELECTROCARDIOGRAM REPORT: CPT | Performed by: INTERNAL MEDICINE

## 2020-09-04 PROCEDURE — 36415 COLL VENOUS BLD VENIPUNCTURE: CPT

## 2020-09-04 PROCEDURE — 80048 BASIC METABOLIC PNL TOTAL CA: CPT | Performed by: ORTHOPAEDIC SURGERY

## 2020-09-04 PROCEDURE — 76376 3D RENDER W/INTRP POSTPROCES: CPT

## 2020-09-04 RX ORDER — GUAIFENESIN 400 MG/1
400 TABLET ORAL 4 TIMES DAILY
COMMUNITY

## 2020-09-04 RX ORDER — FAMOTIDINE 20 MG/1
20 TABLET, FILM COATED ORAL DAILY PRN
COMMUNITY

## 2020-09-04 RX ORDER — CHOLECALCIFEROL (VITAMIN D3) 125 MCG
5 CAPSULE ORAL NIGHTLY PRN
COMMUNITY

## 2020-09-04 NOTE — DISCHARGE INSTRUCTIONS
Take the following medications the morning of surgery:    Amlodipine   pepcid   Guaifenesin  Metoprolol   nasacort nasal spray    If you are on prescription narcotic pain medication to control your pain you may also take that medication the morning of surgery.    General Instructions:  • Do not eat solid food after midnight the night before surgery.  • You may drink clear liquids day of surgery but must stop at least one hour before your hospital arrival time.  • It is beneficial for you to have a clear drink that contains carbohydrates the day of surgery.  We suggest a 12 to 20 ounce bottle of Gatorade or Powerade for non-diabetic patients or a 12 to 20 ounce bottle of G2 or Powerade Zero for diabetic patients. (Pediatric patients, are not advised to drink a 12 to 20 ounce carbohydrate drink)    Clear liquids are liquids you can see through.  Nothing red in color.     Plain water                               Sports drinks  Sodas                                   Gelatin (Jell-O)  Fruit juices without pulp such as white grape juice and apple juice  Popsicles that contain no fruit or yogurt  Tea or coffee (no cream or milk added)  Gatorade / Powerade  G2 / Powerade Zero    • Infants may have breast milk up to four hours before surgery.  • Infants drinking formula may drink formula up to six hours before surgery.   • Patients who avoid smoking, chewing tobacco and alcohol for 4 weeks prior to surgery have a reduced risk of post-operative complications.  Quit smoking as many days before surgery as you can.  • Do not smoke, use chewing tobacco or drink alcohol the day of surgery.   • If applicable bring your C-PAP/ BI-PAP machine.  • Bring any papers given to you in the doctor’s office.  • Wear clean comfortable clothes.  • Do not wear contact lenses, false eyelashes or make-up.  Bring a case for your glasses.   • Bring crutches or walker if applicable.  • Remove all piercings.  Leave jewelry and any other valuables at  home.  • Hair extensions with metal clips must be removed prior to surgery.  • The Pre-Admission Testing nurse will instruct you to bring medications if unable to obtain an accurate list in Pre-Admission Testing.        If you were given a blood bank ID arm band remember to bring it with you the day of surgery.    Preventing a Surgical Site Infection:  • For 2 to 3 days before surgery, avoid shaving with a razor because the razor can irritate skin and make it easier to develop an infection.    • Any areas of open skin can increase the risk of a post-operative wound infection by allowing bacteria to enter and travel throughout the body.  Notify your surgeon if you have any skin wounds / rashes even if it is not near the expected surgical site.  The area will need assessed to determine if surgery should be delayed until it is healed.  • The night prior to surgery shower using a fresh bar of anti-bacterial soap (such as Dial) and clean washcloth.  Sleep in a clean bed with clean clothing.  Do not allow pets to sleep with you.  • Shower on the morning of surgery using a fresh bar of anti-bacterial soap (such as Dial) and clean washcloth.  Dry with a clean towel and dress in clean clothing.  • Ask your surgeon if you will be receiving antibiotics prior to surgery.  • Make sure you, your family, and all healthcare providers clean their hands with soap and water or an alcohol based hand  before caring for you or your wound.    Day of surgery:9/8/2020   0830  Your arrival time is approximately two hours before your scheduled surgery time.  Upon arrival, a Pre-op nurse and Anesthesiologist will review your health history, obtain vital signs, and answer questions you may have.  The only belongings needed at this time will be a list of your home medications and if applicable your C-PAP/BI-PAP machine.  If you are staying overnight your family can leave the rest of your belongings in the car and bring them to your room  later.  A Pre-op nurse will start an IV and you may receive medication in preparation for surgery, including something to help you relax.  Your family will be able to see you in the Pre-op area.  Two visitors at a time will be allowed in the Pre-op room.  While you are in surgery your family should notify the waiting room  if they leave the waiting room area and provide a contact phone number.    Please be aware that surgery does come with discomfort.  We want to make every effort to control your discomfort so please discuss any uncontrolled symptoms with your nurse.   Your doctor will most likely have prescribed pain medications.      If you are going home after surgery you will receive individualized written care instructions before being discharged.  A responsible adult must drive you to and from the hospital on the day of your surgery and stay with you for 24 hours.    If you are staying overnight following surgery, you will be transported to your hospital room following the recovery period.  [facility] has all private rooms.    If you have any questions please call Pre-Admission Testing at [phone].  Deductibles and co-payments are collected on the day of service. Please be prepared to pay the required co-pay, deductible or deposit on the day of service as defined by your plan.    Patient Education for Self-Quarantine Process    Following your COVID testing, we strongly recommend that you do not leave your home after you have been tested for COVID except to get medical care. This includes not going to work, school or to public areas.  If this is not possible for you to do please limit your activities to only required outings.  Be sure to wear a mask when you are with other people, practice social distancing and wash your hands frequently.      The following items provide additional details to keep you safe.  • Wash your hands with soap and water frequently for at least 20  seconds.   • Avoid touching your eyes, nose and mouth with unwashed hands.  • Do not share anything - utensils, towels, food from the same bowl.   • Have your own utensils, drinking glass, dishes, towels and bedding.   • Do not have visitors.   • Do use FaceTime to stay in touch with family and friends.  • You should stay in a specific room away from others if possible.   • Stay at least 6 feet away from others in the home if you cannot have a dedicated room to yourself.   • Do not snuggle with your pet. While the CDC says there is no evidence that pets can spread COVID-19 or be infected from humans, it is probably best to avoid “petting, snuggling, being kissed or licked and sharing food (during self-quarantine)”, according to the CDC.   • Sanitize household surfaces daily. Include all high touch areas (door handles, light switches, phones, countertops, etc.)  • Do not share a bathroom with others, if possible.   • Wear a mask around others in your home if you are unable to stay in a separate room or 6 feet apart. If  you are unable to wear a mask, have your family member wear a mask if they must be within 6 feet of you.   Call your surgeon immediately if you experience any of the following symptoms:  • Sore Throat  • Shortness of Breath or difficulty breathing  • Cough  • Chills  • Body soreness or muscle pain  • Headache  • Fever  • New loss of taste or smell  • Do not arrive for your surgery ill.  Your procedure will need to be rescheduled to another time.  You will need to call your physician before the day of surgery to avoid any unnecessary exposure to hospital staff as well as other patients.

## 2020-09-04 NOTE — H&P
Patient: Eunice Beach  YOB: 1950 69 y.o. female  Medical Record Number: [id number]     Chief Complaints: I hurt my ankle     History of Present Illness: Patient injured her right ankle on 8/30/2020 when she fell on this while at home.  She had persistent complaints of pain and was seen in the [facility] ER on 9/1/2020 and placed into a boot and has been using either a walker or cane with moderate intermittent stabbing aching pain with bruising and swelling to the right ankle worse with standing and improved with ice and rest.         HPI     Allergies:        Allergies   Allergen Reactions   • Zithromax [Azithromycin] Hives         Medications:   Current Outpatient Medications on File Prior to Visit   Medication Sig   • acetaminophen (TYLENOL) 500 MG tablet Take 1,000 mg by mouth.   • amLODIPine (NORVASC) 2.5 MG tablet     • calcium carbonate (OS-ULYSSES) 600 MG tablet Take 600 mg by mouth 2 (Two) Times a Day With Meals.   • cholecalciferol (VITAMIN D3) 1000 units tablet Take 1,000 Units by mouth Daily.   • FLUZONE HIGH-DOSE 0.5 ML suspension prefilled syringe injection ADM 0.5ML IM UTD   • HYDROcodone-acetaminophen (NORCO) 5-325 MG per tablet Take 1 tablet by mouth Every 6 (Six) Hours As Needed for Moderate Pain .   • lisinopril (PRINIVIL,ZESTRIL) 40 MG tablet Take 40 mg by mouth Daily.   • metoprolol tartrate (LOPRESSOR) 50 MG tablet Take 50 mg by mouth 2 (Two) Times a Day.   • mirtazapine (REMERON) 15 MG tablet Take 15 mg by mouth Every Night.   • rOPINIRole (REQUIP) 1 MG tablet Take 1 mg by mouth Every Night.   • tiZANidine (ZANAFLEX) 2 MG tablet Take 2 mg by mouth Every 8 (Eight) Hours As Needed.   • Triamcinolone Acetonide (NASACORT ALLERGY 24HR NA) into the nostril(s) as directed by provider.   • alendronate (FOSAMAX) 70 MG tablet Take 70 mg by mouth Every 7 (Seven) Days.   • amLODIPine (NORVASC) 10 MG tablet Take 10 mg by mouth Daily.   • indapamide (LOZOL) 2.5 MG tablet Take 2.5 mg by mouth  "Daily.      No current facility-administered medications on file prior to visit.          Medical History        Past Medical History:   Diagnosis Date   • Hypertension     • Osteopenia     • Postmenopausal     • RLS (restless legs syndrome)     • Vitamin D deficiency           Surgical History   No past surgical history on file.     Social History            Occupational History   • Not on file   Tobacco Use   • Smoking status: Never Smoker   • Smokeless tobacco: Never Used   Substance and Sexual Activity   • Alcohol use: No       Frequency: Never   • Drug use: No   • Sexual activity: Defer      Social History          Social History Narrative   • Not on file      History reviewed. No pertinent family history.     Review of Systems: 14 point review of systems performed, positive pertinent findings identified in HPI. All remaining systems negative except congestion, drooling, postnasal drip and mucus in the nasal cavity with associated cough, muscle ache     Review of Systems        Physical Exam:   Vitals   Vitals:     09/03/20 1412   Temp: 97.1 °F (36.2 °C)   Weight: 77.1 kg (170 lb)   Height: 170.2 cm (67\")   PainSc:   7         Physical Exam   Constitutional: pleasant, well developed   Eyes: sclera non icteric  Hearing : adequate for exam  Cardiovascular: palpable pulses in right foot, right calf/ thigh NT without sign of DVT  Respiratoy: breathing unlabored   Neurological: grossly sensate to LT throughout right LE  Psychiatric: oriented with normal mood and affect.   Lymphatic: No palpable popliteal lymphadenopathy right LE  Skin: intact throughout right leg/foot  Musculoskeletal: Right ankle shows mild swelling but no blistering.  There is mild to moderate discomfort over the lateral much more so the medial aspect of the ankle and slight discomfort with minimal range of motion which was not stressed today.  She was nontender over the proximal fibula nontender dorsum of the midfoot.  Physical Exam  Ortho " Exam     Radiology: 3 views of the right ankle reviewed on the [employer] system from 9/1/2020 there appears to be a nondisplaced fracture of the medial malleolus extending into the proximal medial tibial area as well as a slightly displaced fracture of the lateral malleolus but I do not appreciate a clear posterior malleolus fracture but may be occult at all seen the lateral shift of the talus compared with the medial clear space.     3 views of the right ankle ordered today to evaluate alignment reviewed and compared with prior x-rays.  There appears to be questionable fracture of the medial aspect of the ankle as well as a slightly displaced and rotated fracture of the distal aspect of the lateral malleolus.        Assessment/Plan: 1.  Right distal fibula fracture with possible medial and/or posterior malleolar fracture.     We reviewed treatment options from a nonoperative and operative standpoint and other no guarantees could be given decision made to proceed with operative treatment in order to restore more anatomic alignment to the fibula and allow earlier weightbearing and range of motion.     She voiced a clear understanding of operative and nonoperative treatment options with associated risk benefits potential outcomes and complications of operative treatment which can include but not limited to heart attack stroke death pneumonia infection bleeding damage to blood vessels nerves or tendons blood clots pulmonary embolism persistent or worsening pain stiffness malunion nonunion and failure to return to presurgery and precondition levels of activity as well as wound healing complications.

## 2020-09-05 ENCOUNTER — LAB (OUTPATIENT)
Dept: LAB | Facility: HOSPITAL | Age: 70
End: 2020-09-05

## 2020-09-05 DIAGNOSIS — Z01.818 OTHER SPECIFIED PRE-OPERATIVE EXAMINATION: ICD-10-CM

## 2020-09-05 PROCEDURE — U0004 COV-19 TEST NON-CDC HGH THRU: HCPCS

## 2020-09-05 PROCEDURE — C9803 HOPD COVID-19 SPEC COLLECT: HCPCS

## 2020-09-07 LAB — SARS-COV-2 RNA RESP QL NAA+PROBE: NOT DETECTED

## 2020-09-08 ENCOUNTER — ANESTHESIA (OUTPATIENT)
Dept: PERIOP | Facility: HOSPITAL | Age: 70
End: 2020-09-08

## 2020-09-08 ENCOUNTER — ANESTHESIA EVENT (OUTPATIENT)
Dept: PERIOP | Facility: HOSPITAL | Age: 70
End: 2020-09-08

## 2020-09-08 ENCOUNTER — DOCUMENTATION (OUTPATIENT)
Dept: ORTHOPEDIC SURGERY | Facility: CLINIC | Age: 70
End: 2020-09-08

## 2020-09-08 ENCOUNTER — HOSPITAL ENCOUNTER (OUTPATIENT)
Facility: HOSPITAL | Age: 70
Setting detail: HOSPITAL OUTPATIENT SURGERY
Discharge: HOME | End: 2020-09-08
Attending: ORTHOPAEDIC SURGERY | Admitting: ORTHOPAEDIC SURGERY

## 2020-09-08 VITALS
DIASTOLIC BLOOD PRESSURE: 105 MMHG | OXYGEN SATURATION: 94 % | SYSTOLIC BLOOD PRESSURE: 153 MMHG | HEART RATE: 74 BPM | RESPIRATION RATE: 18 BRPM | TEMPERATURE: 99 F

## 2020-09-08 DIAGNOSIS — S82.61XA CLOSED DISPLACED FRACTURE OF LATERAL MALLEOLUS OF RIGHT FIBULA, INITIAL ENCOUNTER: ICD-10-CM

## 2020-09-08 PROCEDURE — G0463 HOSPITAL OUTPT CLINIC VISIT: HCPCS | Performed by: ORTHOPAEDIC SURGERY

## 2020-09-08 RX ORDER — NALBUPHINE HCL 10 MG/ML
10 AMPUL (ML) INJECTION EVERY 4 HOURS PRN
Status: CANCELLED | OUTPATIENT
Start: 2020-09-08

## 2020-09-08 RX ORDER — HYDRALAZINE HYDROCHLORIDE 20 MG/ML
5 INJECTION INTRAMUSCULAR; INTRAVENOUS
Status: CANCELLED | OUTPATIENT
Start: 2020-09-08

## 2020-09-08 RX ORDER — LIDOCAINE HYDROCHLORIDE 10 MG/ML
0.5 INJECTION, SOLUTION EPIDURAL; INFILTRATION; INTRACAUDAL; PERINEURAL ONCE AS NEEDED
Status: DISCONTINUED | OUTPATIENT
Start: 2020-09-08 | End: 2020-09-08 | Stop reason: HOSPADM

## 2020-09-08 RX ORDER — NALBUPHINE HCL 10 MG/ML
2 AMPUL (ML) INJECTION EVERY 4 HOURS PRN
Status: CANCELLED | OUTPATIENT
Start: 2020-09-08

## 2020-09-08 RX ORDER — NALOXONE HCL 0.4 MG/ML
0.4 VIAL (ML) INJECTION AS NEEDED
Status: CANCELLED | OUTPATIENT
Start: 2020-09-08

## 2020-09-08 RX ORDER — SODIUM CHLORIDE 0.9 % (FLUSH) 0.9 %
10 SYRINGE (ML) INJECTION AS NEEDED
Status: DISCONTINUED | OUTPATIENT
Start: 2020-09-08 | End: 2020-09-08 | Stop reason: HOSPADM

## 2020-09-08 RX ORDER — FENTANYL CITRATE 50 UG/ML
50 INJECTION, SOLUTION INTRAMUSCULAR; INTRAVENOUS
Status: CANCELLED | OUTPATIENT
Start: 2020-09-08

## 2020-09-08 RX ORDER — SODIUM CHLORIDE, SODIUM LACTATE, POTASSIUM CHLORIDE, CALCIUM CHLORIDE 600; 310; 30; 20 MG/100ML; MG/100ML; MG/100ML; MG/100ML
9 INJECTION, SOLUTION INTRAVENOUS CONTINUOUS
Status: DISCONTINUED | OUTPATIENT
Start: 2020-09-08 | End: 2020-09-08 | Stop reason: HOSPADM

## 2020-09-08 RX ORDER — DIPHENHYDRAMINE HYDROCHLORIDE 50 MG/ML
12.5 INJECTION INTRAMUSCULAR; INTRAVENOUS
Status: CANCELLED | OUTPATIENT
Start: 2020-09-08

## 2020-09-08 RX ORDER — SODIUM CHLORIDE 0.9 % (FLUSH) 0.9 %
10 SYRINGE (ML) INJECTION EVERY 12 HOURS SCHEDULED
Status: DISCONTINUED | OUTPATIENT
Start: 2020-09-08 | End: 2020-09-08 | Stop reason: HOSPADM

## 2020-09-08 RX ORDER — ACETAMINOPHEN 500 MG
500 TABLET ORAL ONCE
Status: COMPLETED | OUTPATIENT
Start: 2020-09-08 | End: 2020-09-08

## 2020-09-08 RX ORDER — MIDAZOLAM HYDROCHLORIDE 1 MG/ML
1 INJECTION INTRAMUSCULAR; INTRAVENOUS
Status: DISCONTINUED | OUTPATIENT
Start: 2020-09-08 | End: 2020-09-08 | Stop reason: HOSPADM

## 2020-09-08 RX ORDER — HYDROMORPHONE HYDROCHLORIDE 1 MG/ML
0.25 INJECTION, SOLUTION INTRAMUSCULAR; INTRAVENOUS; SUBCUTANEOUS
Status: CANCELLED | OUTPATIENT
Start: 2020-09-08 | End: 2020-09-09

## 2020-09-08 RX ORDER — FENTANYL CITRATE 50 UG/ML
25 INJECTION, SOLUTION INTRAMUSCULAR; INTRAVENOUS
Status: DISCONTINUED | OUTPATIENT
Start: 2020-09-08 | End: 2020-09-08 | Stop reason: HOSPADM

## 2020-09-08 RX ORDER — ACETAMINOPHEN 650 MG/1
650 SUPPOSITORY RECTAL ONCE AS NEEDED
Status: CANCELLED | OUTPATIENT
Start: 2020-09-08

## 2020-09-08 RX ORDER — CEFAZOLIN SODIUM 2 G/100ML
2 INJECTION, SOLUTION INTRAVENOUS ONCE
Status: DISCONTINUED | OUTPATIENT
Start: 2020-09-08 | End: 2020-09-08 | Stop reason: HOSPADM

## 2020-09-08 RX ORDER — ACETAMINOPHEN 325 MG/1
650 TABLET ORAL ONCE AS NEEDED
Status: CANCELLED | OUTPATIENT
Start: 2020-09-08

## 2020-09-08 RX ORDER — HYDROCODONE BITARTRATE AND ACETAMINOPHEN 5; 325 MG/1; MG/1
1 TABLET ORAL ONCE AS NEEDED
Status: CANCELLED | OUTPATIENT
Start: 2020-09-08

## 2020-09-08 RX ADMIN — ACETAMINOPHEN 500 MG: 500 TABLET ORAL at 09:24

## 2020-09-08 RX ADMIN — SODIUM CHLORIDE, POTASSIUM CHLORIDE, SODIUM LACTATE AND CALCIUM CHLORIDE 9 ML/HR: 600; 310; 30; 20 INJECTION, SOLUTION INTRAVENOUS at 09:10

## 2020-09-08 NOTE — ANESTHESIA PREPROCEDURE EVALUATION
Anesthesia Evaluation     no history of anesthetic complications:  NPO Solid Status: > 8 hours             Airway   Mallampati: III  TM distance: <3 FB  Neck ROM: full  Possible difficult intubation  Dental - normal exam     Pulmonary - negative pulmonary ROS and normal exam   Cardiovascular   Exercise tolerance: good (4-7 METS)    Rhythm: regular    (+) hypertension,   (-) murmur      Neuro/Psych  GI/Hepatic/Renal/Endo    (+)  GERD well controlled,      Musculoskeletal     Abdominal    Substance History      OB/GYN          Other                        Anesthesia Plan    ASA 2     general with block   (  D/W R&B of GA including but not limited to: heart, lung, liver, kidney, neurologic problems, positioning injuries, dental damage, corneal abrasion and TMJ.  .Risks of peripheral nerve block were discussed with patient including but not limited to: inadequate block, bleeding, infection, persistent numbness or weakness, nerve damage, painful dysasthesia and need to protect limb while numb.)  intravenous induction

## 2020-09-08 NOTE — NURSING NOTE
SURGERY  CANCELLED DUE TO LOW OXYGEN SATURATION, COUGH.  CALL TO PRIMARY CARE MD FOR APPOINTMENT TO BE SEEN TODAY.

## 2020-09-08 NOTE — PROGRESS NOTES
Patient was scheduled to have surgery this morning and evidently presented while I was doing another case and saw anesthesia who did not feel comfortable putting her to sleep today due to some questionable pulmonary issues and that she was having difficulty keeping her saturations up evidently gas while she was talking and reported that she felt a little short of breath.  I discussed with the anesthesiologist whether he thought she needed to be admitted which he did not.  Also discussed whether she should go to the ER for further evaluation and upon his further discussion with her she was able to get into see her primary care provider this morning but has not yet seen them.  I did speak with her prior to her appointment and she is also can have them look at her EKG to make sure it looks okay prior to doing any surgery.  She will let me know what she finds out as to when she could be able to have her surgery done from a medical standpoint.

## 2020-09-10 ENCOUNTER — TELEPHONE (OUTPATIENT)
Dept: ORTHOPEDIC SURGERY | Facility: CLINIC | Age: 70
End: 2020-09-10

## 2020-09-10 NOTE — TELEPHONE ENCOUNTER
Spoke to patient & scheduled her to see MWM this Mon 9/14 at 1250 for PO / RIGHT Ankle FX 9/01/20 / Need XR / per MWM. Patient verbalized understanding.

## 2020-09-10 NOTE — TELEPHONE ENCOUNTER
"I called patient as I had not yet heard back from her after we had to cancel her surgery earlier this week due to some pulmonary issues.  She said that she has been \"trying to stay off it is much as possible\" but I think is still putting some weight on it and sound like she been using her air stirrup rather than her boot and complains of some throbbing pain especially at rest to the right ankle.  I recommended that she get back into her boot whenever she is up and about and try to limit weight on it as much as possible preferably no weightbearing.    Regarding her pulmonary status she did see her primary care provider earlier this week and was told that she should not have any surgery \"at least for a couple of weeks\" and is on prednisone and inhaler and sound like she was having what she described was asthmatic bronchitis and they did not feel it was safe from a pulmonary standpoint for her to have surgery    She also saw her PCP about some questionable EKG changes and is due to see cardiologist tomorrow    All that being said obviously probably not a great idea to do surgery on her at this point and may be able to potentially treat this nonoperatively as we had discussed operative and nonoperative treatment options.    Recommended that she limit weight on it and use her boot and preferably a walker or scooter rather than a cane and again a see her on Monday to recheck x-rays and assess status and maybe need to put her in a cast at that point.  "

## 2020-09-14 ENCOUNTER — OFFICE VISIT (OUTPATIENT)
Dept: ORTHOPEDIC SURGERY | Facility: CLINIC | Age: 70
End: 2020-09-14

## 2020-09-14 VITALS — HEIGHT: 67 IN | BODY MASS INDEX: 27.78 KG/M2 | WEIGHT: 177 LBS | TEMPERATURE: 97.7 F

## 2020-09-14 DIAGNOSIS — S93.431D SYNDESMOTIC DISRUPTION OF ANKLE, RIGHT, SUBSEQUENT ENCOUNTER: ICD-10-CM

## 2020-09-14 DIAGNOSIS — S82.61XD CLOSED DISPLACED FRACTURE OF LATERAL MALLEOLUS OF RIGHT FIBULA WITH ROUTINE HEALING, SUBSEQUENT ENCOUNTER: Primary | ICD-10-CM

## 2020-09-14 PROBLEM — S82.63XD CLOSED DISPLACED FRACTURE OF LATERAL MALLEOLUS OF FIBULA WITH ROUTINE HEALING: Status: ACTIVE | Noted: 2020-09-03

## 2020-09-14 PROCEDURE — 73610 X-RAY EXAM OF ANKLE: CPT | Performed by: ORTHOPAEDIC SURGERY

## 2020-09-14 PROCEDURE — 99213 OFFICE O/P EST LOW 20 MIN: CPT | Performed by: ORTHOPAEDIC SURGERY

## 2020-09-14 RX ORDER — AZITHROMYCIN 500 MG/1
TABLET, FILM COATED ORAL
COMMUNITY
Start: 2020-09-09 | End: 2021-06-07

## 2020-09-14 NOTE — PROGRESS NOTES
"Ankle Follow Up      Patient: Eunice Beach    YOB: 1950 69 y.o. female    Chief Complaints: Ankle feeling better    History of Present Illness: Patient was really seen on 9/3/2020 with a right distal fibula fracture and plans were made for operative fixation on 9/8/2020 however while at the hospital anesthesia felt that she was not a good candidate for anesthesia that day due to some pulmonary problems.  She subsequently saw her PCP and was told she had asthmatic bronchitis and was recommended not to have surgery for at least several weeks.  Also had some questionable changes on her EKG and I spoke with her last week and she was going to see a cardiologist.  At that time she says she had been staying off\"as much as possible\" said she has been using her walker and do the majority of the time but has her cane with her today and has taken a few steps with just her air stirrup to the bathroom and has no complaints of any pain when she is in the boot and only gets an occasional throb at night.  Pain is rated today at 0 out of 10.    The primary provider has her on several rounds of antibiotics and is checking her back again on 9/23/2020 to determine how she is doing and whether or not she could have surgery if it is needed.  She also saw her cardiologist who she tells me told her that she was okay to have surgery if needed.  She tells me  HPI    ROS: ankle pain  Past Medical History:   Diagnosis Date   • Ankle fracture     right   • Ankle pain, right    • Arthritis    • GERD (gastroesophageal reflux disease)    • History of transfusion     as an infant  no reaction  was RH neg    • Hypertension    • Hypoxemia     low O2 pulse ox readings in PAT 83-92% RA    • Osteopenia    • Postmenopausal    • Reactive depression (situational)    • RLS (restless legs syndrome)    • Vitamin D deficiency        Physical Exam:   Vitals:    09/14/20 1320   Temp: 97.7 °F (36.5 °C)   Weight: 80.3 kg (177 lb)   Height: " "170.2 cm (67\")   PainSc: 0-No pain     Well developed with normal mood.  On exam she has very slight swelling to the right ankle she had really no focal tenderness to the distal fibula to palpation or with range of motion and no tenderness medially.    Radiology: 3 views of the right ankle ordered today to evaluate alignment reviewed and compared with prior x-rays there is been no change in alignment of the distal fibula fracture with very slight displacement but none compared with previous x-rays and no medial widening or obvious widening of the syndesmosis.        CT scan films and report of the right ankle from 9/4/2020 were reviewed which shows an avulsion fragment measuring 20 mm x 14 mm and displaced about 5 mm anteromedially from the distal anterolateral tibia at the expected attachment of the anterior distal syndesmotic ligament also shows the distal fibular fracture which is essentially nondisplaced but does demonstrate some very slight displacement of the a millimeter or 2.      Assessment/Plan: Right distal fibula fracture with a avulsion fracture of the anterolateral tibia at the syndesmotic attachment.    We reviewed treatment options and she was told not to have surgery at this point and without any change in alignment despite weightbearing on this this is certainly something we could consider nonoperative treatment for and I discussed this with her previously that with surgical treatment at could expedite healing and early return to weightbearing but has risks of surgery.    Going to continue with nonoperative treatment for now until she is cleared from a pulmonary standpoint and she was fitted with a taller boot today and recommend only partial weightbearing with walker at all times and she can sleep in an air stirrup as she has been doing.    I will see her back in about 10 days with x-rays of her right ankle.  "

## 2020-09-24 ENCOUNTER — OFFICE VISIT (OUTPATIENT)
Dept: ORTHOPEDIC SURGERY | Facility: CLINIC | Age: 70
End: 2020-09-24

## 2020-09-24 VITALS — HEIGHT: 67 IN | BODY MASS INDEX: 27.78 KG/M2 | WEIGHT: 177 LBS | TEMPERATURE: 97.4 F

## 2020-09-24 DIAGNOSIS — R52 PAIN: Primary | ICD-10-CM

## 2020-09-24 DIAGNOSIS — S82.61XD CLOSED DISPLACED FRACTURE OF LATERAL MALLEOLUS OF RIGHT FIBULA WITH ROUTINE HEALING, SUBSEQUENT ENCOUNTER: ICD-10-CM

## 2020-09-24 DIAGNOSIS — S93.431D SYNDESMOTIC DISRUPTION OF ANKLE, RIGHT, SUBSEQUENT ENCOUNTER: ICD-10-CM

## 2020-09-24 PROCEDURE — 99213 OFFICE O/P EST LOW 20 MIN: CPT | Performed by: ORTHOPAEDIC SURGERY

## 2020-09-24 PROCEDURE — 73610 X-RAY EXAM OF ANKLE: CPT | Performed by: ORTHOPAEDIC SURGERY

## 2020-09-24 NOTE — PROGRESS NOTES
"Ankle Follow Up      Patient: Eunice Beach    YOB: 1950 69 y.o. female    Chief Complaints: Ankle feels okay    History of Present Illness: Patient follows up for right ankle fracture with anterolateral distal tibial avulsion fracture.    Please see notes from 9/14/2020 for details.  She was initially planned to have surgical treatment but was having pulmonary problems.    She is subsequently been following up with her doctor for that and has found out that she has asthma and is been told that she could probably have surgery if it was absolutely necessary and is also seen a cardiologist.    She been using her boot and cane and really has minimal if any complaints of pain to the right ankle that has been improving  HPI    ROS: ankle pain  Past Medical History:   Diagnosis Date   • Ankle fracture     right   • Ankle pain, right    • Arthritis    • GERD (gastroesophageal reflux disease)    • History of transfusion     as an infant  no reaction  was RH neg    • Hypertension    • Hypoxemia     low O2 pulse ox readings in PAT 83-92% RA    • Osteopenia    • Postmenopausal    • Reactive depression (situational)    • RLS (restless legs syndrome)    • Vitamin D deficiency        Physical Exam:   Vitals:    09/24/20 0839   Temp: 97.4 °F (36.3 °C)   Weight: 80.3 kg (177 lb)   Height: 170.2 cm (67\")   PainSc: 0-No pain     Well developed with normal mood.  Right ankle shows minimal swelling there is really no focal tenderness to palpation of the lateral malleolus to palpation or with gentle external rotation testing and no tenderness medially.      Radiology: 3 views of the right ankle ordered evaluate fracture reviewed and compared with previous x-rays.  There is been no change in alignment to the distal fibula fracture with minimal displacement no lateral shift of the talus within the mortise or widening medially.      Assessment/Plan: 1.  Right distal fibula fracture  2.  Right avulsion fracture of the " anterolateral tibia at the syndesmotic attachment.    We discussed treatment options going forward and she said she would prefer to avoid surgery less was absolutely 100% necessary.    Reviewed with her that given her current clinical exam as well as the x-ray findings I did not feel that it was absolutely necessary that she have surgery for this.    Therefore we made the mutual decision to continue with nonoperative treatment understanding that should she fail to heal or have displacement or persistent problems he could still require surgical treatment.    We will have her continue with her boot and cane and limit activities.    If she has any exacerbation of pain should get off and let me know otherwise I will see her back in 2 weeks x-rays of her right ankle

## 2020-10-08 ENCOUNTER — OFFICE VISIT (OUTPATIENT)
Dept: ORTHOPEDIC SURGERY | Facility: CLINIC | Age: 70
End: 2020-10-08

## 2020-10-08 VITALS — TEMPERATURE: 97.4 F | BODY MASS INDEX: 27.62 KG/M2 | HEIGHT: 67 IN | WEIGHT: 176 LBS

## 2020-10-08 DIAGNOSIS — S82.61XD CLOSED DISPLACED FRACTURE OF LATERAL MALLEOLUS OF RIGHT FIBULA WITH ROUTINE HEALING, SUBSEQUENT ENCOUNTER: Primary | ICD-10-CM

## 2020-10-08 PROCEDURE — 73610 X-RAY EXAM OF ANKLE: CPT | Performed by: ORTHOPAEDIC SURGERY

## 2020-10-08 PROCEDURE — 99213 OFFICE O/P EST LOW 20 MIN: CPT | Performed by: ORTHOPAEDIC SURGERY

## 2020-10-08 RX ORDER — APIXABAN 5 MG/1
TABLET, FILM COATED ORAL
COMMUNITY
Start: 2020-09-29

## 2020-10-08 NOTE — PROGRESS NOTES
"Ankle Follow Up      Patient: Eunice Beach    YOB: 1950 69 y.o. female    Chief Complaints: Ankle \"not bad at all, no pain\".    History of Present Illness: Patient follows up right ankle fracture with anterolateral tibial avulsion fracture.    See notes from 9/14/2020 for details..  Initially planned on having surgical treatment but she was having some pulmonary problems and found to have bronchitis and asthma.  She also had some questionable changes on her EKG.    She was last seen on 9/24/2020 and decision had been made to continue with nonoperative treatment as she was not having any pain and there was no appreciable displacement compared with initial x-rays.    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    She has no complaints of pain in the right ankle    She is now on Eliquis.  HPI    ROS: No ankle pain  Past Medical History:   Diagnosis Date   • Ankle fracture     right   • Ankle pain, right    • Arthritis    • GERD (gastroesophageal reflux disease)    • History of transfusion     as an infant  no reaction  was RH neg    • Hypertension    • Hypoxemia     low O2 pulse ox readings in PAT 83-92% RA    • Osteopenia    • Postmenopausal    • Reactive depression (situational)    • RLS (restless legs syndrome)    • Vitamin D deficiency        Physical Exam:   Vitals:    10/08/20 1014   Temp: 97.4 °F (36.3 °C)   Weight: 79.8 kg (176 lb)   Height: 170.2 cm (67\")   PainSc:   2     Well developed with normal mood.  Right ankle showed minimal if any swelling no warmth erythema there was no focal tenderness along the fibula and no pain with gentle external rotation testing.  She was grossly sensate light touch in the right lower extremity and right calf remains nontender      Radiology: 3 views of the right ankle ordered evaluate fracture alignment reviewed and compared with previous " "x-rays.  There is been no change in alignment to the fracture does appear to be some early callus formation and no lateral shift of the talus within the mortise.     Assessment/Plan:  1.  Right distal fibula fracture  2.  Right avulsion fracture of the anterolateral tibia at the syndesmotic attachment.    Overall she seems to be doing well with nonoperative treatment    She says she \"thinks I save her life\" by canceling her surgery and having her see someone about her lungs and heart as she was found to have DVTs and PEs and is being followed elsewhere now for these.    Obviously would recommend anything from a surgical standpoint with all of that going on and she is on Eliquis.  Right now things seem to be amenable to nonoperative treatment and she will continue with her boot and cane and I will see her back in 3 weeks x-rays of her right ankle.     "

## 2020-10-29 ENCOUNTER — OFFICE VISIT (OUTPATIENT)
Dept: ORTHOPEDIC SURGERY | Facility: CLINIC | Age: 70
End: 2020-10-29

## 2020-10-29 VITALS — BODY MASS INDEX: 27.62 KG/M2 | TEMPERATURE: 96.8 F | WEIGHT: 176 LBS | HEIGHT: 67 IN

## 2020-10-29 DIAGNOSIS — S82.61XD CLOSED DISPLACED FRACTURE OF LATERAL MALLEOLUS OF RIGHT FIBULA WITH ROUTINE HEALING, SUBSEQUENT ENCOUNTER: Primary | ICD-10-CM

## 2020-10-29 PROCEDURE — 99213 OFFICE O/P EST LOW 20 MIN: CPT | Performed by: ORTHOPAEDIC SURGERY

## 2020-10-29 PROCEDURE — 73610 X-RAY EXAM OF ANKLE: CPT | Performed by: ORTHOPAEDIC SURGERY

## 2020-10-29 NOTE — PROGRESS NOTES
"Ankle Follow Up      Patient: Eunice Beach    YOB: 1950 70 y.o. female    Chief Complaints: Ankle \"feels fine\"    History of Present Illness: Patient follows up right lateral malleolus fracture and anterolateral tibial avulsion fracture that occurred on 8/30/2020.    We had initially planned nonsurgical treatment but due to some pulmonary problems and questionable changes on her EKG surgery was delayed and she underwent further work-up and was eventually found to have PEs and DVT and is on Eliquis.  We will continue with nonoperative treatment of the right ankle and thankfully has had no appreciable displacement on no complaints of pain in the right ankle.    She was last seen on 10/8/2020 and has been going occasionally without her boot or brace to the bathroom and has been using a cane out of the house and walker in the house but does not have heated with her today.  HPI    ROS: No ankle pain  Past Medical History:   Diagnosis Date   • Ankle fracture     right   • Ankle pain, right    • Arthritis    • GERD (gastroesophageal reflux disease)    • History of transfusion     as an infant  no reaction  was RH neg    • Hypertension    • Hypoxemia     low O2 pulse ox readings in PAT 83-92% RA    • Osteopenia    • Postmenopausal    • Reactive depression (situational)    • RLS (restless legs syndrome)    • Vitamin D deficiency        Physical Exam:   Vitals:    10/29/20 1025   Temp: 96.8 °F (36 °C)   Weight: 79.8 kg (176 lb)   Height: 170.2 cm (67\")   PainSc:   1     Well developed with normal mood.  Right ankle showed minimal if any swelling she was grossly sensate light touch and had no tenderness at all over the distal fibula to palpation or with external rotation testing.      Radiology: 3 views of the right ankle ordered evaluate fracture alignment reviewed and compared to previous x-rays.  Been no change in alignment to the distal fibula fracture which exhibit some increased callus formation " there is no widening at all medially.  Nor any lateral shift of the talus within the mortise      Assessment/Plan:  1.  Right distal fibula fracture  2.  Right avulsion fracture of the anterolateral tibia at the syndesmotic attachment.    Overall she seems to doing very well with nonoperative treatment and given her continued improvement as well as multiple other medical problems at this time I would not recommend any surgical treatment.    Seems to be doing well from a nonoperative standpoint.    I have her wean out of her boot into an air stirrup brace for the next 7 to 10 days around the house and continue with a cane or walker and if she continues to do well may then wean out of her boot altogether out of the house but continue with cane or walker.    She has any recurrence of pain she will back off and let me know otherwise I will see her back in 3 weeks with x-ray of the right ankle

## 2020-11-19 ENCOUNTER — OFFICE VISIT (OUTPATIENT)
Dept: ORTHOPEDIC SURGERY | Facility: CLINIC | Age: 70
End: 2020-11-19

## 2020-11-19 VITALS — WEIGHT: 179 LBS | TEMPERATURE: 96.9 F | HEIGHT: 67 IN | BODY MASS INDEX: 28.09 KG/M2

## 2020-11-19 DIAGNOSIS — S82.61XD CLOSED DISPLACED FRACTURE OF LATERAL MALLEOLUS OF RIGHT FIBULA WITH ROUTINE HEALING, SUBSEQUENT ENCOUNTER: ICD-10-CM

## 2020-11-19 DIAGNOSIS — R52 PAIN: Primary | ICD-10-CM

## 2020-11-19 PROCEDURE — 73610 X-RAY EXAM OF ANKLE: CPT | Performed by: ORTHOPAEDIC SURGERY

## 2020-11-19 PROCEDURE — 99213 OFFICE O/P EST LOW 20 MIN: CPT | Performed by: ORTHOPAEDIC SURGERY

## 2020-11-19 NOTE — PROGRESS NOTES
"Ankle Follow Up      Patient: Eunice Beach    YOB: 1950 70 y.o. female    Chief Complaints: Ankle \"has no pain at all\"    History of Present Illness:Patient follows up right lateral malleolus fracture and anterolateral tibial avulsion fracture that occurred on 8/30/2020.     We had initially planned nonsurgical treatment but due to some pulmonary problems and questionable changes on her EKG surgery was delayed and she underwent further work-up and was eventually found to have PEs and DVT and is on Eliquis.  We will continue with nonoperative treatment of the right ankle and thankfully has had no appreciable displacement on no complaints of pain in the right ankle.    She was last seen on 10/29/2020 and is weaned out of her boot to just a small brace and has no complaints of pain in her ankle at all.  She has not been wearing it when she gets up to go to the bathroom at night and has no complaints of pain at that time either.  HPI    ROS: No ankle pain  Past Medical History:   Diagnosis Date   • Ankle fracture     right   • Ankle pain, right    • Arthritis    • GERD (gastroesophageal reflux disease)    • History of transfusion     as an infant  no reaction  was RH neg    • Hypertension    • Hypoxemia     low O2 pulse ox readings in PAT 83-92% RA    • Osteopenia    • Postmenopausal    • Reactive depression (situational)    • RLS (restless legs syndrome)    • Vitamin D deficiency        Physical Exam:   Vitals:    11/19/20 0943   Temp: 96.9 °F (36.1 °C)   Weight: 81.2 kg (179 lb)   Height: 170.2 cm (67\")   PainSc: 0-No pain     Well developed with normal mood.  Right ankle showed really no appreciable swelling but was grossly sensate to light touch.  She had no pain to palpation of the lateral ankle or pain on the distal fibula with external rotation or range of motion testing.      Radiology: 3 views of the right ankle ordered evaluate fracture alignment reviewed and compared to previous x-rays.  " Talus remains well-seated within the mortise there is no change in alignment of the distal fibula fracture which does appear to have some callus formation of the fracture line is still slightly evident.      Assessment/Plan:  1.  Right distal fibula fracture  2.  Right avulsion fracture of the anterolateral tibia at the syndesmotic attachment.    Reviewed treatment options and x-ray findings with her and clinically she is doing quite without any complaints of pain I would therefore not recommend any surgical treatment especially not with her medical problems as outlined above.    She may start gradually weaning out of her brace initially around the house and then gradually out of it if she has any onset of pain should get office and let me know otherwise I will see her back in 4 weeks for final check with x-rays of her right ankle.

## 2020-12-14 ENCOUNTER — CLINICAL SUPPORT (OUTPATIENT)
Dept: ORTHOPEDIC SURGERY | Facility: CLINIC | Age: 70
End: 2020-12-14

## 2020-12-14 VITALS — WEIGHT: 170 LBS | BODY MASS INDEX: 26.68 KG/M2 | HEIGHT: 67 IN | TEMPERATURE: 97.3 F

## 2020-12-14 DIAGNOSIS — M17.11 PRIMARY OSTEOARTHRITIS OF RIGHT KNEE: Primary | ICD-10-CM

## 2020-12-14 PROCEDURE — 20610 DRAIN/INJ JOINT/BURSA W/O US: CPT | Performed by: ORTHOPAEDIC SURGERY

## 2020-12-14 RX ORDER — BECLOMETHASONE DIPROPIONATE 80 UG/1
AEROSOL, METERED NASAL
COMMUNITY
Start: 2020-12-07

## 2020-12-14 RX ORDER — METHYLPREDNISOLONE ACETATE 80 MG/ML
80 INJECTION, SUSPENSION INTRA-ARTICULAR; INTRALESIONAL; INTRAMUSCULAR; SOFT TISSUE
Status: COMPLETED | OUTPATIENT
Start: 2020-12-14 | End: 2020-12-14

## 2020-12-14 RX ADMIN — METHYLPREDNISOLONE ACETATE 80 MG: 80 INJECTION, SUSPENSION INTRA-ARTICULAR; INTRALESIONAL; INTRAMUSCULAR; SOFT TISSUE at 13:14

## 2020-12-14 NOTE — PROGRESS NOTES
Ms. Beach comes in today for follow-up.  Injections have worked well in the past.  The patient would like to get a repeat injection today.  The risks, benefits and alternatives were discussed and the patient consented.  Going forward, the patient will follow-up as needed.    ROCK Knight    12/14/2020      Large Joint Arthrocentesis: R knee  Date/Time: 12/14/2020 1:14 PM  Consent given by: patient  Site marked: site marked  Timeout: Immediately prior to procedure a time out was called to verify the correct patient, procedure, equipment, support staff and site/side marked as required   Supporting Documentation  Indications: pain   Procedure Details  Location: knee - R knee  Preparation: Patient was prepped and draped in the usual sterile fashion  Needle gauge: 21G.  Approach: anterolateral  Medications administered: 2 mL lidocaine (cardiac); 80 mg methylPREDNISolone acetate 80 MG/ML  Patient tolerance: patient tolerated the procedure well with no immediate complications

## 2021-03-22 ENCOUNTER — BULK ORDERING (OUTPATIENT)
Dept: CASE MANAGEMENT | Facility: OTHER | Age: 71
End: 2021-03-22

## 2021-03-22 DIAGNOSIS — Z23 IMMUNIZATION DUE: ICD-10-CM

## 2021-04-16 ENCOUNTER — OFFICE VISIT (OUTPATIENT)
Dept: ORTHOPEDIC SURGERY | Facility: CLINIC | Age: 71
End: 2021-04-16

## 2021-04-16 VITALS — WEIGHT: 170 LBS | BODY MASS INDEX: 26.68 KG/M2 | HEIGHT: 67 IN | TEMPERATURE: 96.9 F

## 2021-04-16 DIAGNOSIS — M17.11 PRIMARY OSTEOARTHRITIS OF RIGHT KNEE: Primary | ICD-10-CM

## 2021-04-16 PROCEDURE — 20610 DRAIN/INJ JOINT/BURSA W/O US: CPT | Performed by: NURSE PRACTITIONER

## 2021-04-16 RX ORDER — METHYLPREDNISOLONE ACETATE 80 MG/ML
80 INJECTION, SUSPENSION INTRA-ARTICULAR; INTRALESIONAL; INTRAMUSCULAR; SOFT TISSUE
Status: COMPLETED | OUTPATIENT
Start: 2021-04-16 | End: 2021-04-16

## 2021-04-16 RX ORDER — FUROSEMIDE 20 MG/1
20 TABLET ORAL
COMMUNITY
Start: 2021-04-14 | End: 2021-04-28

## 2021-04-16 RX ORDER — MONTELUKAST SODIUM 10 MG/1
10 TABLET ORAL DAILY
COMMUNITY
Start: 2021-02-09

## 2021-04-16 RX ORDER — HYDRALAZINE HYDROCHLORIDE 25 MG/1
25 TABLET, FILM COATED ORAL 3 TIMES DAILY
COMMUNITY
Start: 2021-04-13 | End: 2021-05-13

## 2021-04-16 RX ORDER — LIDOCAINE HYDROCHLORIDE 20 MG/ML
2 INJECTION, SOLUTION EPIDURAL; INFILTRATION; INTRACAUDAL; PERINEURAL
Status: COMPLETED | OUTPATIENT
Start: 2021-04-16 | End: 2021-04-16

## 2021-04-16 RX ADMIN — LIDOCAINE HYDROCHLORIDE 2 ML: 20 INJECTION, SOLUTION EPIDURAL; INFILTRATION; INTRACAUDAL; PERINEURAL at 14:42

## 2021-04-16 RX ADMIN — METHYLPREDNISOLONE ACETATE 80 MG: 80 INJECTION, SUSPENSION INTRA-ARTICULAR; INTRALESIONAL; INTRAMUSCULAR; SOFT TISSUE at 14:42

## 2021-04-16 NOTE — PROGRESS NOTES
Ms. Beach comes in today for follow-up.  Injections have worked well in the past.  The patient would like to get a repeat injection today.  The risks, benefits and alternatives were discussed and the patient consented.  Going forward, the patient will follow-up as needed.    ROCK Knight    04/16/2021      Large Joint Arthrocentesis: R knee  Date/Time: 4/16/2021 2:42 PM  Consent given by: patient  Site marked: site marked  Timeout: Immediately prior to procedure a time out was called to verify the correct patient, procedure, equipment, support staff and site/side marked as required   Supporting Documentation  Indications: pain   Procedure Details  Location: knee - R knee  Preparation: Patient was prepped and draped in the usual sterile fashion  Needle gauge: 21G.  Approach: anterolateral  Medications administered: 2 mL lidocaine PF 2% 2 %; 80 mg methylPREDNISolone acetate 80 MG/ML  Patient tolerance: patient tolerated the procedure well with no immediate complications

## 2021-04-30 ENCOUNTER — APPOINTMENT (OUTPATIENT)
Dept: WOMENS IMAGING | Facility: HOSPITAL | Age: 71
End: 2021-04-30

## 2021-04-30 PROCEDURE — 77067 SCR MAMMO BI INCL CAD: CPT | Performed by: RADIOLOGY

## 2021-04-30 PROCEDURE — 77080 DXA BONE DENSITY AXIAL: CPT | Performed by: RADIOLOGY

## 2021-04-30 PROCEDURE — 77063 BREAST TOMOSYNTHESIS BI: CPT | Performed by: RADIOLOGY

## 2021-06-07 ENCOUNTER — HOSPITAL ENCOUNTER (EMERGENCY)
Facility: HOSPITAL | Age: 71
Discharge: HOME | End: 2021-06-07
Attending: EMERGENCY MEDICINE | Admitting: EMERGENCY MEDICINE

## 2021-06-07 ENCOUNTER — APPOINTMENT (OUTPATIENT)
Dept: GENERAL RADIOLOGY | Facility: HOSPITAL | Age: 71
End: 2021-06-07

## 2021-06-07 VITALS
DIASTOLIC BLOOD PRESSURE: 84 MMHG | TEMPERATURE: 98.4 F | OXYGEN SATURATION: 94 % | WEIGHT: 158 LBS | BODY MASS INDEX: 24.8 KG/M2 | HEIGHT: 67 IN | RESPIRATION RATE: 16 BRPM | HEART RATE: 94 BPM | SYSTOLIC BLOOD PRESSURE: 139 MMHG

## 2021-06-07 DIAGNOSIS — M10.9 ACUTE GOUT OF LEFT HAND, UNSPECIFIED CAUSE: ICD-10-CM

## 2021-06-07 DIAGNOSIS — M79.89 SWELLING OF LEFT HAND: Primary | ICD-10-CM

## 2021-06-07 LAB
ALBUMIN SERPL-MCNC: 3.9 G/DL (ref 3.5–5.2)
ALBUMIN/GLOB SERPL: 1.5 G/DL
ALP SERPL-CCNC: 96 U/L (ref 39–117)
ALT SERPL W P-5'-P-CCNC: 14 U/L (ref 1–33)
ANION GAP SERPL CALCULATED.3IONS-SCNC: 7.4 MMOL/L (ref 5–15)
AST SERPL-CCNC: 14 U/L (ref 1–32)
BASOPHILS # BLD AUTO: 0.03 10*3/MM3 (ref 0–0.2)
BASOPHILS NFR BLD AUTO: 0.3 % (ref 0–1.5)
BILIRUB SERPL-MCNC: 0.3 MG/DL (ref 0–1.2)
BUN SERPL-MCNC: 18 MG/DL (ref 8–23)
BUN/CREAT SERPL: 18.6 (ref 7–25)
CALCIUM SPEC-SCNC: 9.4 MG/DL (ref 8.6–10.5)
CHLORIDE SERPL-SCNC: 104 MMOL/L (ref 98–107)
CO2 SERPL-SCNC: 31.6 MMOL/L (ref 22–29)
CREAT SERPL-MCNC: 0.97 MG/DL (ref 0.57–1)
DEPRECATED RDW RBC AUTO: 45.4 FL (ref 37–54)
EOSINOPHIL # BLD AUTO: 0.07 10*3/MM3 (ref 0–0.4)
EOSINOPHIL NFR BLD AUTO: 0.7 % (ref 0.3–6.2)
ERYTHROCYTE [DISTWIDTH] IN BLOOD BY AUTOMATED COUNT: 12.7 % (ref 12.3–15.4)
GFR SERPL CREATININE-BSD FRML MDRD: 57 ML/MIN/1.73
GLOBULIN UR ELPH-MCNC: 2.6 GM/DL
GLUCOSE SERPL-MCNC: 105 MG/DL (ref 65–99)
HCT VFR BLD AUTO: 40.4 % (ref 34–46.6)
HGB BLD-MCNC: 14.1 G/DL (ref 12–15.9)
IMM GRANULOCYTES # BLD AUTO: 0.08 10*3/MM3 (ref 0–0.05)
IMM GRANULOCYTES NFR BLD AUTO: 0.8 % (ref 0–0.5)
LYMPHOCYTES # BLD AUTO: 1.43 10*3/MM3 (ref 0.7–3.1)
LYMPHOCYTES NFR BLD AUTO: 14.4 % (ref 19.6–45.3)
MCH RBC QN AUTO: 34.6 PG (ref 26.6–33)
MCHC RBC AUTO-ENTMCNC: 34.9 G/DL (ref 31.5–35.7)
MCV RBC AUTO: 99 FL (ref 79–97)
MONOCYTES # BLD AUTO: 0.69 10*3/MM3 (ref 0.1–0.9)
MONOCYTES NFR BLD AUTO: 6.9 % (ref 5–12)
NEUTROPHILS NFR BLD AUTO: 7.64 10*3/MM3 (ref 1.7–7)
NEUTROPHILS NFR BLD AUTO: 76.9 % (ref 42.7–76)
NRBC BLD AUTO-RTO: 0 /100 WBC (ref 0–0.2)
PLATELET # BLD AUTO: 168 10*3/MM3 (ref 140–450)
PMV BLD AUTO: 10.3 FL (ref 6–12)
POTASSIUM SERPL-SCNC: 4.2 MMOL/L (ref 3.5–5.2)
PROT SERPL-MCNC: 6.5 G/DL (ref 6–8.5)
RBC # BLD AUTO: 4.08 10*6/MM3 (ref 3.77–5.28)
SODIUM SERPL-SCNC: 143 MMOL/L (ref 136–145)
URATE SERPL-MCNC: 7.8 MG/DL (ref 2.4–5.7)
WBC # BLD AUTO: 9.94 10*3/MM3 (ref 3.4–10.8)

## 2021-06-07 PROCEDURE — 80053 COMPREHEN METABOLIC PANEL: CPT | Performed by: PHYSICIAN ASSISTANT

## 2021-06-07 PROCEDURE — 99283 EMERGENCY DEPT VISIT LOW MDM: CPT

## 2021-06-07 PROCEDURE — 63710000001 DEXAMETHASONE PER 0.25 MG: Performed by: PHYSICIAN ASSISTANT

## 2021-06-07 PROCEDURE — 84550 ASSAY OF BLOOD/URIC ACID: CPT | Performed by: PHYSICIAN ASSISTANT

## 2021-06-07 PROCEDURE — 85025 COMPLETE CBC W/AUTO DIFF WBC: CPT | Performed by: PHYSICIAN ASSISTANT

## 2021-06-07 PROCEDURE — 73130 X-RAY EXAM OF HAND: CPT

## 2021-06-07 RX ORDER — COLCHICINE 0.6 MG/1
TABLET ORAL
Qty: 7 TABLET | Refills: 0 | Status: SHIPPED | OUTPATIENT
Start: 2021-06-07

## 2021-06-07 RX ADMIN — DEXAMETHASONE 10 MG: 4 TABLET ORAL at 17:18

## 2021-06-07 NOTE — ED PROVIDER NOTES
EMERGENCY DEPARTMENT ENCOUNTER    Room Number:  02/02  Date seen:  6/7/2021  Time seen: 15:15 EDT  PCP: Tianna Robins MD  Historian: Patient    HPI:  Chief complaint: Hand pain  A complete HPI/ROS/PMH/PSH/SH/FH are unobtainable due to: None  Context:Eunice Beach is a 70 y.o. female, hx of arthritis, who presents to the ED with c/o left hand pain that started 4 days ago, denies any recent injury to the area.  She noticed 2 days ago the hand swelling which has been gradually getting worse since then.  She rates her pain a 3 out of 10 pain scale.  She is right-hand dominant.    Patient was placed in face mask in first look. Patient was wearing facemask when I entered the room and throughout our encounter. I wore full protective equipment throughout this patient encounter including a face mask, goggles, and gloves. Hand hygiene was performed before donning protective equipment and after removal when leaving the room.    MEDICAL RECORD REVIEW      ALLERGIES  Patient has no known allergies.    PAST MEDICAL HISTORY  Active Ambulatory Problems     Diagnosis Date Noted   • Closed displaced fracture of lateral malleolus of fibula with routine healing 09/03/2020   • Syndesmotic disruption of ankle, right, subsequent encounter 09/14/2020     Resolved Ambulatory Problems     Diagnosis Date Noted   • No Resolved Ambulatory Problems     Past Medical History:   Diagnosis Date   • Ankle fracture    • Ankle pain, right    • Arthritis    • GERD (gastroesophageal reflux disease)    • History of transfusion    • Hypertension    • Hypoxemia    • Osteopenia    • Postmenopausal    • Reactive depression (situational)    • RLS (restless legs syndrome)    • Vitamin D deficiency        PAST SURGICAL HISTORY  Past Surgical History:   Procedure Laterality Date   • APPENDECTOMY     • COLONOSCOPY     • FRACTURE SURGERY     • HEMORRHOIDECTOMY     • INGUINAL HERNIA REPAIR Bilateral    • PATELLA FRACTURE SURGERY Right    • SALPINGO  OOPHORECTOMY Bilateral    • TIBIA FRACTURE SURGERY Left    • TONSILLECTOMY     • WRIST FRACTURE SURGERY Left        FAMILY HISTORY  Family History   Problem Relation Age of Onset   • Malig Hyperthermia Neg Hx        SOCIAL HISTORY  Social History     Socioeconomic History   • Marital status:      Spouse name: Not on file   • Number of children: Not on file   • Years of education: Not on file   • Highest education level: Not on file   Tobacco Use   • Smoking status: Never Smoker   • Smokeless tobacco: Never Used   Substance and Sexual Activity   • Alcohol use: Yes     Comment: 1 pint a week   • Drug use: Yes     Types: Marijuana   • Sexual activity: Defer       REVIEW OF SYSTEMS  Review of Systems    All systems reviewed and negative except for those discussed in HPI.     PHYSICAL EXAM    ED Triage Vitals   Temp Heart Rate Resp BP SpO2   06/07/21 1334 06/07/21 1334 06/07/21 1334 06/07/21 1445 06/07/21 1334   98.4 °F (36.9 °C) 94 16 139/84 99 %      Temp src Heart Rate Source Patient Position BP Location FiO2 (%)   -- -- 06/07/21 1445 06/07/21 1445 --     Lying Left arm      Physical Exam    I have reviewed the triage vital signs and nursing notes.      GENERAL: not distressed  HENT: nares patent  EYES: no scleral icterus  NECK: no ROM limitations  CV: regular rhythm, regular rate  RESPIRATORY: normal effort; ctab  ABDOMEN: soft   : deferred  MUSCULOSKELETAL: no deformity;   NEURO: alert, moves all extremities, follows commands  SKIN: warm, dry; left hand: Mild erythema over the dorsum of the hand with moderate soft tissue swelling present, minimal warmth present, minimal point tenderness present over the dorsum of the left hand.  Brisk cap refill present in all digits of left hand with 2+ left radial pulse, sensation is intact to light touch, no evidence of wounds    LAB RESULTS  Recent Results (from the past 24 hour(s))   Uric Acid    Collection Time: 06/07/21  3:52 PM    Specimen: Blood   Result Value Ref  Range    Uric Acid 7.8 (H) 2.4 - 5.7 mg/dL   Comprehensive Metabolic Panel    Collection Time: 06/07/21  3:52 PM    Specimen: Blood   Result Value Ref Range    Glucose 105 (H) 65 - 99 mg/dL    BUN 18 8 - 23 mg/dL    Creatinine 0.97 0.57 - 1.00 mg/dL    Sodium 143 136 - 145 mmol/L    Potassium 4.2 3.5 - 5.2 mmol/L    Chloride 104 98 - 107 mmol/L    CO2 31.6 (H) 22.0 - 29.0 mmol/L    Calcium 9.4 8.6 - 10.5 mg/dL    Total Protein 6.5 6.0 - 8.5 g/dL    Albumin 3.90 3.50 - 5.20 g/dL    ALT (SGPT) 14 1 - 33 U/L    AST (SGOT) 14 1 - 32 U/L    Alkaline Phosphatase 96 39 - 117 U/L    Total Bilirubin 0.3 0.0 - 1.2 mg/dL    eGFR Non African Amer 57 (L) >60 mL/min/1.73    Globulin 2.6 gm/dL    A/G Ratio 1.5 g/dL    BUN/Creatinine Ratio 18.6 7.0 - 25.0    Anion Gap 7.4 5.0 - 15.0 mmol/L   CBC Auto Differential    Collection Time: 06/07/21  3:53 PM    Specimen: Blood   Result Value Ref Range    WBC 9.94 3.40 - 10.80 10*3/mm3    RBC 4.08 3.77 - 5.28 10*6/mm3    Hemoglobin 14.1 12.0 - 15.9 g/dL    Hematocrit 40.4 34.0 - 46.6 %    MCV 99.0 (H) 79.0 - 97.0 fL    MCH 34.6 (H) 26.6 - 33.0 pg    MCHC 34.9 31.5 - 35.7 g/dL    RDW 12.7 12.3 - 15.4 %    RDW-SD 45.4 37.0 - 54.0 fl    MPV 10.3 6.0 - 12.0 fL    Platelets 168 140 - 450 10*3/mm3    Neutrophil % 76.9 (H) 42.7 - 76.0 %    Lymphocyte % 14.4 (L) 19.6 - 45.3 %    Monocyte % 6.9 5.0 - 12.0 %    Eosinophil % 0.7 0.3 - 6.2 %    Basophil % 0.3 0.0 - 1.5 %    Immature Grans % 0.8 (H) 0.0 - 0.5 %    Neutrophils, Absolute 7.64 (H) 1.70 - 7.00 10*3/mm3    Lymphocytes, Absolute 1.43 0.70 - 3.10 10*3/mm3    Monocytes, Absolute 0.69 0.10 - 0.90 10*3/mm3    Eosinophils, Absolute 0.07 0.00 - 0.40 10*3/mm3    Basophils, Absolute 0.03 0.00 - 0.20 10*3/mm3    Immature Grans, Absolute 0.08 (H) 0.00 - 0.05 10*3/mm3    nRBC 0.0 0.0 - 0.2 /100 WBC         RADIOLOGY RESULTS  XR Hand 3+ View Left   Final Result            PROGRESS, DATA ANALYSIS, CONSULTS AND MEDICAL DECISION MAKING  All labs have been  "independently reviewed by me.  All radiology studies have been reviewed by me and discussed with radiologist dictating the report. Discussion below represents my analysis of pertinent findings related to patient's condition, differential diagnosis, treatment plan and final disposition.     ED Course as of Jun 07 1917   Mon Jun 07, 2021 1916 The x-ray is unremarkable, white blood cell count is normal.  Patient does have an elevated uric acid.  We will give her a steroid before she leaves the ED and will prescribe her a short course of colchicine.  She expressed understanding and all questions addressed at this time.    [SS]      ED Course User Index  [SS] Geni Benton PA-C       The differential diagnosis include but are not limited to gout, cellulitis, fracture.         Reviewed pt's history and workup with Dr. Gonzalez.  After a bedside evaluation, Dr. Gonzalez agrees with the plan of care.    (FOR DISCHARGE)The patient's history, physical exam, and lab findings were discussed with the physician, who also performed a face to face history and physical exam.  I discussed all results and noted any abnormalities with patient.  Discussed absoute need to recheck abnormalities with their family physician.  I answered any of the patient's questions.  Discussed plan for discharge, as there is no emergent indication for admission.  Pt is agreeable and understands need for follow up and repeat testing.  Pt is aware that discharge does not mean that nothing is wrong but it indicates no emergency is present and they must continue care with their family physician.  Pt is discharged with instructions to follow up with primary care doctor to have their blood pressure rechecked.           Disposition vitals:  /84 (BP Location: Left arm, Patient Position: Lying)   Pulse 94   Temp 98.4 °F (36.9 °C)   Resp 16   Ht 170.2 cm (67\")   Wt 71.7 kg (158 lb)   SpO2 94%   BMI 24.75 kg/m²       DIAGNOSIS  Final diagnoses: "   Swelling of left hand   Acute gout of left hand, unspecified cause       FOLLOW UP   Tianna Robins MD  [address] [address]  [phone]    Call in 3 days  To recheck your hand    [facility] Emergency Department  [address] [facility] [address] [phone]    As needed, If symptoms worsen         Geni Benton PA-C  06/07/21 [id number]

## 2021-06-07 NOTE — ED PROVIDER NOTES
Pt presents to the ED c/o  left hand pain and swelling that started about 4 days ago.  The pain is currently mild, localized to the left hand, nonradiating.  She has had associated swelling of the left hand.  She denies fever or chills.  She reports that she was told recently that her uric acid level was elevated but she denies history of gout.     On exam,   Awake and alert, no acute distress.  Left hand with mild erythema over the dorsum of the hand with moderate soft tissue swelling present.  There is minimal warmth present.  There is minimal point tenderness present over the dorsum of the left hand.  Brisk cap refill present in all digits of left hand with 2+ left radial pulse.  There is no soft tissue crepitance.  Skin is normal to inspection with no evidence of wound.     Plan: Atraumatic swelling and redness of the left hand with elevated uric acid level on labs.  Findings concerning for acute gouty arthritis.  She will be given Decadron followed by prescription for colchicine.  Instructed to follow-up closely with PCP and RICE therapy was discussed.  Return precautions were discussed.      I wore a mask, face shield, and gloves during this patient encounter.  Patient also wearing a surgical mask.  Hand hygeine performed before and after seeing the patient.     Attestation:  The JOEL and I have discussed this patient's history, physical exam, and treatment plan.  I have reviewed the documentation and personally had a face to face interaction with the patient. I affirm the documentation and agree with the treatment and plan.  The attached note describes my personal findings.            Fabiano Goss MD  06/07/21 [id number]

## 2021-06-07 NOTE — ED TRIAGE NOTES
States sudden onset wrist pain with hand swelling onset Thursday, denies injury.       Mask placed on patient in triage. Triage staff wore appropriate PPE during interaction with patient.

## 2021-06-07 NOTE — DISCHARGE INSTRUCTIONS
Please take the prescription as prescribed.  Follow-up with your primary care doctor to recheck your hand later this week.

## 2021-06-07 NOTE — ED NOTES
Pt this ED c/o acute onset swelling L wrist beginning Saturday, 06/05; denies any notable pain, denies mechanical injury; pt does report history of osteoporosis, arthritis; reports wrist fracture on affected side in the 90s.  On assessment, pt's wrist mildly swollen, no redness noted; pt reports very mild tenderness on palpation; no crepitus or deformity noted, radial pulse 3+, cap refill less than three seconds.     Frederick Galeana RN  06/07/21 [id number]

## 2021-08-20 ENCOUNTER — OFFICE VISIT (OUTPATIENT)
Dept: ORTHOPEDIC SURGERY | Facility: CLINIC | Age: 71
End: 2021-08-20

## 2021-08-20 VITALS — TEMPERATURE: 97.2 F | BODY MASS INDEX: 24.8 KG/M2 | HEIGHT: 67 IN | WEIGHT: 158 LBS

## 2021-08-20 DIAGNOSIS — M17.11 PRIMARY OSTEOARTHRITIS OF RIGHT KNEE: Primary | ICD-10-CM

## 2021-08-20 PROCEDURE — 20610 DRAIN/INJ JOINT/BURSA W/O US: CPT | Performed by: NURSE PRACTITIONER

## 2021-08-20 PROCEDURE — 73562 X-RAY EXAM OF KNEE 3: CPT | Performed by: NURSE PRACTITIONER

## 2021-08-20 RX ORDER — LIDOCAINE HYDROCHLORIDE 10 MG/ML
2 INJECTION, SOLUTION EPIDURAL; INFILTRATION; INTRACAUDAL; PERINEURAL
Status: COMPLETED | OUTPATIENT
Start: 2021-08-20 | End: 2021-08-20

## 2021-08-20 RX ORDER — METHYLPREDNISOLONE ACETATE 80 MG/ML
80 INJECTION, SUSPENSION INTRA-ARTICULAR; INTRALESIONAL; INTRAMUSCULAR; SOFT TISSUE
Status: COMPLETED | OUTPATIENT
Start: 2021-08-20 | End: 2021-08-20

## 2021-08-20 RX ORDER — HYDRALAZINE HYDROCHLORIDE 25 MG/1
25 TABLET, FILM COATED ORAL 3 TIMES DAILY
COMMUNITY
Start: 2021-04-13

## 2021-08-20 RX ORDER — ALLOPURINOL 100 MG/1
TABLET ORAL
COMMUNITY
Start: 2021-07-26

## 2021-08-20 RX ADMIN — LIDOCAINE HYDROCHLORIDE 2 ML: 10 INJECTION, SOLUTION EPIDURAL; INFILTRATION; INTRACAUDAL; PERINEURAL at 10:39

## 2021-08-20 RX ADMIN — METHYLPREDNISOLONE ACETATE 80 MG: 80 INJECTION, SUSPENSION INTRA-ARTICULAR; INTRALESIONAL; INTRAMUSCULAR; SOFT TISSUE at 10:39

## 2021-08-20 NOTE — PROGRESS NOTES
Ms. Beach comes in today for follow-up.  Injections have worked well in the past.  The patient would like to get a repeat injection today.      Imaging:  Bilateral standing AP views, bilateral merchants views and a lateral view of the right knee are ordered by myself and reviewed to evaluate the patient's complaint.  These were compared to previous films. The x-rays show tricompartmental degenerative changes.  She appears to have significant lateral compartment osteoarthritis with near bone-on-bone degeneration, osteophyte formation, and significant subchondral sclerosis.  There is a loose body noted at the lateral aspect of the patella.  No significant changes compared to previous films.     The risks, benefits and alternatives were discussed and the patient consented.  Going forward, the patient will follow-up as needed.    RCOK Knight    08/20/2021    Large Joint Arthrocentesis: R knee  Date/Time: 8/20/2021 10:39 AM  Consent given by: patient  Site marked: site marked  Timeout: Immediately prior to procedure a time out was called to verify the correct patient, procedure, equipment, support staff and site/side marked as required   Supporting Documentation  Indications: pain   Procedure Details  Location: knee - R knee  Preparation: Patient was prepped and draped in the usual sterile fashion  Needle gauge: 21G.  Approach: anterolateral  Medications administered: 80 mg methylPREDNISolone acetate 80 MG/ML; 2 mL lidocaine PF 1% 1 %  Patient tolerance: patient tolerated the procedure well with no immediate complications

## 2023-09-12 ENCOUNTER — HOSPITAL ENCOUNTER (OUTPATIENT)
Dept: PET IMAGING | Facility: HOSPITAL | Age: 73
Discharge: HOME | End: 2023-09-12
Payer: MEDICARE

## 2023-09-12 ENCOUNTER — TRANSCRIBE ORDERS (OUTPATIENT)
Dept: PET IMAGING | Facility: HOSPITAL | Age: 73
End: 2023-09-12
Payer: MEDICARE

## 2023-09-12 ENCOUNTER — TRANSCRIBE ORDERS (OUTPATIENT)
Dept: WOMENS IMAGING | Facility: HOSPITAL | Age: 73
End: 2023-09-12
Payer: MEDICARE

## 2023-09-12 ENCOUNTER — APPOINTMENT (OUTPATIENT)
Dept: WOMENS IMAGING | Facility: HOSPITAL | Age: 73
End: 2023-09-12
Payer: MEDICARE

## 2023-09-12 DIAGNOSIS — M81.0 HIGH RISK FOR FRACTURE DUE TO OSTEOPOROSIS BY DEXA SCAN: Primary | ICD-10-CM

## 2023-09-12 PROCEDURE — 77067 SCR MAMMO BI INCL CAD: CPT | Performed by: RADIOLOGY

## 2023-09-12 PROCEDURE — 77063 BREAST TOMOSYNTHESIS BI: CPT | Performed by: RADIOLOGY

## 2023-09-12 PROCEDURE — 77080 DXA BONE DENSITY AXIAL: CPT | Performed by: RADIOLOGY

## 2023-09-12 PROCEDURE — 77080 DXA BONE DENSITY AXIAL: CPT

## (undated) DEVICE — DISPOSABLE TOURNIQUET CUFF SINGLE BLADDER, SINGLE PORT AND QUICK CONNECT CONNECTOR: Brand: COLOR CUFF

## (undated) DEVICE — GLV SURG SIGNATURE ESSENTIAL PF LTX SZ8

## (undated) DEVICE — GLV SURG BIOGEL LTX PF 8

## (undated) DEVICE — SPLNT ORTHOGLASS 4INX15FT

## (undated) DEVICE — SUT VIC 3/0 SH 27IN J416H

## (undated) DEVICE — PK ORTHO MINOR TOWER 40

## (undated) DEVICE — ELECTRD NDL EZ CLN MOD 2.75IN

## (undated) DEVICE — DRSNG GZ PETROLTM XEROFORM CURAD 1X8IN STRL

## (undated) DEVICE — GOWN,NON-REINFORCED,SIRUS,SET IN SLV,XXL: Brand: MEDLINE

## (undated) DEVICE — SUT VIC 2/0 SH 27IN

## (undated) DEVICE — PREP SOL POVIDONE/IODINE BT 4OZ

## (undated) DEVICE — TOWEL,OR,DSP,ST,BLUE,STD,4/PK,20PK/CS: Brand: MEDLINE

## (undated) DEVICE — GAUZE,SPONGE,FLUFF,6"X6.75",STRL,10/TRAY: Brand: MEDLINE

## (undated) DEVICE — SKIN PREP TRAY W/CHG: Brand: MEDLINE INDUSTRIES, INC.

## (undated) DEVICE — UNDERCAST PADDING: Brand: DEROYAL

## (undated) DEVICE — PAD,ABDOMINAL,8"X10",ST,LF: Brand: MEDLINE

## (undated) DEVICE — STPLR SKIN VISISTAT WD 35CT

## (undated) DEVICE — DRP C/ARM 41X74IN

## (undated) DEVICE — BNDG ELAS ELITE V/CLOSE 4IN 5YD LF STRL

## (undated) DEVICE — APPL CHLORAPREP HI/LITE 26ML ORNG

## (undated) DEVICE — SPNG LAP 18X18IN LF STRL PK/5